# Patient Record
Sex: MALE | Race: WHITE | Employment: OTHER | URBAN - METROPOLITAN AREA
[De-identification: names, ages, dates, MRNs, and addresses within clinical notes are randomized per-mention and may not be internally consistent; named-entity substitution may affect disease eponyms.]

---

## 2017-01-05 ENCOUNTER — TELEPHONE (OUTPATIENT)
Dept: CARDIOLOGY CLINIC | Age: 62
End: 2017-01-05

## 2017-01-09 ENCOUNTER — PROCEDURE VISIT (OUTPATIENT)
Dept: CARDIOLOGY CLINIC | Age: 62
End: 2017-01-09

## 2017-01-09 ENCOUNTER — OFFICE VISIT (OUTPATIENT)
Dept: CARDIOLOGY CLINIC | Age: 62
End: 2017-01-09

## 2017-01-09 VITALS
OXYGEN SATURATION: 94 % | WEIGHT: 167 LBS | HEART RATE: 60 BPM | HEIGHT: 68 IN | SYSTOLIC BLOOD PRESSURE: 124 MMHG | BODY MASS INDEX: 25.31 KG/M2 | DIASTOLIC BLOOD PRESSURE: 62 MMHG

## 2017-01-09 DIAGNOSIS — I25.10 CORONARY ARTERY DISEASE INVOLVING NATIVE HEART WITHOUT ANGINA PECTORIS, UNSPECIFIED VESSEL OR LESION TYPE: Primary | ICD-10-CM

## 2017-01-09 DIAGNOSIS — I42.9 CARDIOMYOPATHY (HCC): ICD-10-CM

## 2017-01-09 DIAGNOSIS — I49.5 SINUS NODE DYSFUNCTION (HCC): ICD-10-CM

## 2017-01-09 DIAGNOSIS — Z95.0 CARDIAC PACEMAKER IN SITU: Primary | ICD-10-CM

## 2017-01-09 DIAGNOSIS — I50.22 SYSTOLIC CHF, CHRONIC (HCC): ICD-10-CM

## 2017-01-09 DIAGNOSIS — I10 ESSENTIAL HYPERTENSION: ICD-10-CM

## 2017-01-09 PROCEDURE — 93280 PM DEVICE PROGR EVAL DUAL: CPT | Performed by: INTERNAL MEDICINE

## 2017-01-09 PROCEDURE — 99214 OFFICE O/P EST MOD 30 MIN: CPT | Performed by: NURSE PRACTITIONER

## 2017-01-13 ENCOUNTER — TELEPHONE (OUTPATIENT)
Dept: CARDIOLOGY CLINIC | Age: 62
End: 2017-01-13

## 2017-02-09 ENCOUNTER — HOSPITAL ENCOUNTER (OUTPATIENT)
Dept: CARDIOLOGY | Facility: CLINIC | Age: 62
Discharge: HOME | End: 2017-02-09
Attending: NURSE PRACTITIONER | Admitting: NURSE PRACTITIONER

## 2017-02-09 ENCOUNTER — PROCEDURE VISIT (OUTPATIENT)
Dept: CARDIOLOGY CLINIC | Age: 62
End: 2017-02-09

## 2017-02-09 DIAGNOSIS — R07.9 CHEST PAIN, UNSPECIFIED TYPE: Primary | ICD-10-CM

## 2017-02-09 LAB
LV EF: 33 %
LV EF: 34 %
LVEF MODALITY: NORMAL
LVEF MODALITY: NORMAL

## 2017-02-13 ENCOUNTER — TELEPHONE (OUTPATIENT)
Dept: CARDIOLOGY CLINIC | Age: 62
End: 2017-02-13

## 2017-02-27 ENCOUNTER — OFFICE VISIT (OUTPATIENT)
Dept: CARDIOLOGY CLINIC | Age: 62
End: 2017-02-27

## 2017-02-27 VITALS
HEART RATE: 60 BPM | DIASTOLIC BLOOD PRESSURE: 68 MMHG | BODY MASS INDEX: 25.48 KG/M2 | OXYGEN SATURATION: 97 % | WEIGHT: 168.12 LBS | HEIGHT: 68 IN | SYSTOLIC BLOOD PRESSURE: 110 MMHG

## 2017-02-27 DIAGNOSIS — I10 ESSENTIAL HYPERTENSION: ICD-10-CM

## 2017-02-27 DIAGNOSIS — I25.110 CORONARY ARTERY DISEASE INVOLVING NATIVE CORONARY ARTERY OF NATIVE HEART WITH UNSTABLE ANGINA PECTORIS (HCC): ICD-10-CM

## 2017-02-27 DIAGNOSIS — Z95.5 PRESENCE OF STENT IN LAD CORONARY ARTERY: ICD-10-CM

## 2017-02-27 DIAGNOSIS — I42.9 CARDIOMYOPATHY (HCC): Primary | ICD-10-CM

## 2017-02-27 DIAGNOSIS — Z95.0 HX OF CARDIAC PACEMAKER: ICD-10-CM

## 2017-02-27 PROCEDURE — 99214 OFFICE O/P EST MOD 30 MIN: CPT | Performed by: INTERNAL MEDICINE

## 2017-02-27 RX ORDER — LISINOPRIL 10 MG/1
TABLET ORAL
Qty: 90 TABLET | Refills: 3 | Status: SHIPPED | OUTPATIENT
Start: 2017-02-27 | End: 2017-10-30 | Stop reason: SDUPTHER

## 2017-02-27 RX ORDER — ATORVASTATIN CALCIUM 80 MG/1
80 TABLET, FILM COATED ORAL NIGHTLY
Qty: 90 TABLET | Refills: 3 | Status: SHIPPED | OUTPATIENT
Start: 2017-02-27 | End: 2018-01-17 | Stop reason: SDUPTHER

## 2017-02-27 RX ORDER — CLOPIDOGREL BISULFATE 75 MG/1
75 TABLET ORAL DAILY
Qty: 90 TABLET | Refills: 3 | Status: ON HOLD | OUTPATIENT
Start: 2017-02-27 | End: 2017-07-13

## 2017-02-27 RX ORDER — CARVEDILOL 12.5 MG/1
12.5 TABLET ORAL 2 TIMES DAILY
Qty: 180 TABLET | Refills: 3 | Status: SHIPPED | OUTPATIENT
Start: 2017-02-27 | End: 2018-01-12 | Stop reason: SDUPTHER

## 2017-02-28 DIAGNOSIS — E78.2 MIXED HYPERLIPIDEMIA: Primary | ICD-10-CM

## 2017-04-26 ENCOUNTER — PROCEDURE VISIT (OUTPATIENT)
Dept: CARDIOLOGY CLINIC | Age: 62
End: 2017-04-26

## 2017-04-26 ENCOUNTER — OFFICE VISIT (OUTPATIENT)
Dept: CARDIOLOGY CLINIC | Age: 62
End: 2017-04-26

## 2017-04-26 VITALS
HEIGHT: 68 IN | BODY MASS INDEX: 25.61 KG/M2 | DIASTOLIC BLOOD PRESSURE: 64 MMHG | SYSTOLIC BLOOD PRESSURE: 122 MMHG | HEART RATE: 60 BPM | WEIGHT: 169 LBS

## 2017-04-26 DIAGNOSIS — I50.22 SYSTOLIC CHF, CHRONIC (HCC): ICD-10-CM

## 2017-04-26 DIAGNOSIS — Z95.0 HX OF CARDIAC PACEMAKER: Primary | ICD-10-CM

## 2017-04-26 DIAGNOSIS — I42.9 CARDIOMYOPATHY (HCC): ICD-10-CM

## 2017-04-26 DIAGNOSIS — I42.9 CARDIOMYOPATHY (HCC): Primary | ICD-10-CM

## 2017-04-26 DIAGNOSIS — I49.5 SINUS NODE DYSFUNCTION (HCC): ICD-10-CM

## 2017-04-26 DIAGNOSIS — Z95.0 CARDIAC PACEMAKER IN SITU: ICD-10-CM

## 2017-04-26 PROCEDURE — 99214 OFFICE O/P EST MOD 30 MIN: CPT | Performed by: INTERNAL MEDICINE

## 2017-04-26 PROCEDURE — 93280 PM DEVICE PROGR EVAL DUAL: CPT | Performed by: INTERNAL MEDICINE

## 2017-05-08 ENCOUNTER — TELEPHONE (OUTPATIENT)
Dept: CARDIOLOGY CLINIC | Age: 62
End: 2017-05-08

## 2017-05-25 ENCOUNTER — HOSPITAL ENCOUNTER (OUTPATIENT)
Dept: CARDIOLOGY | Facility: CLINIC | Age: 62
Discharge: HOME | End: 2017-05-25
Attending: INTERNAL MEDICINE | Admitting: INTERNAL MEDICINE

## 2017-06-02 ENCOUNTER — TELEPHONE (OUTPATIENT)
Dept: CARDIOLOGY CLINIC | Age: 62
End: 2017-06-02

## 2017-07-12 DIAGNOSIS — Z95.810 ICD (IMPLANTABLE CARDIOVERTER-DEFIBRILLATOR) IN PLACE: Primary | ICD-10-CM

## 2017-07-19 ENCOUNTER — PROCEDURE VISIT (OUTPATIENT)
Dept: CARDIOLOGY CLINIC | Age: 62
End: 2017-07-19

## 2017-07-19 DIAGNOSIS — I42.9 CARDIOMYOPATHY (HCC): ICD-10-CM

## 2017-07-19 DIAGNOSIS — I47.20 VENTRICULAR TACHYCARDIA: ICD-10-CM

## 2017-07-19 DIAGNOSIS — Z95.810 ICD (IMPLANTABLE CARDIOVERTER-DEFIBRILLATOR) IN PLACE: Primary | ICD-10-CM

## 2017-07-19 DIAGNOSIS — I49.5 SINUS NODE DYSFUNCTION (HCC): ICD-10-CM

## 2017-07-19 PROCEDURE — 93283 PRGRMG EVAL IMPLANTABLE DFB: CPT | Performed by: INTERNAL MEDICINE

## 2017-10-23 ENCOUNTER — OFFICE VISIT (OUTPATIENT)
Dept: CARDIOLOGY CLINIC | Age: 62
End: 2017-10-23

## 2017-10-23 ENCOUNTER — PROCEDURE VISIT (OUTPATIENT)
Dept: CARDIOLOGY CLINIC | Age: 62
End: 2017-10-23

## 2017-10-23 VITALS
BODY MASS INDEX: 26.52 KG/M2 | DIASTOLIC BLOOD PRESSURE: 62 MMHG | WEIGHT: 175 LBS | HEIGHT: 68 IN | SYSTOLIC BLOOD PRESSURE: 120 MMHG

## 2017-10-23 DIAGNOSIS — I50.22 SYSTOLIC CHF, CHRONIC (HCC): ICD-10-CM

## 2017-10-23 DIAGNOSIS — Z95.810 DUAL ICD (IMPLANTABLE CARDIOVERTER-DEFIBRILLATOR) IN PLACE: Primary | ICD-10-CM

## 2017-10-23 DIAGNOSIS — I25.110 CORONARY ARTERY DISEASE INVOLVING NATIVE CORONARY ARTERY OF NATIVE HEART WITH UNSTABLE ANGINA PECTORIS (HCC): ICD-10-CM

## 2017-10-23 DIAGNOSIS — Z95.810 ICD (IMPLANTABLE CARDIOVERTER-DEFIBRILLATOR) IN PLACE: ICD-10-CM

## 2017-10-23 DIAGNOSIS — Z95.810 DUAL ICD (IMPLANTABLE CARDIOVERTER-DEFIBRILLATOR) IN PLACE: ICD-10-CM

## 2017-10-23 DIAGNOSIS — I47.20 VENTRICULAR TACHYCARDIA: ICD-10-CM

## 2017-10-23 DIAGNOSIS — I48.0 PAF (PAROXYSMAL ATRIAL FIBRILLATION) (HCC): ICD-10-CM

## 2017-10-23 DIAGNOSIS — I25.5 ISCHEMIC CARDIOMYOPATHY: Primary | ICD-10-CM

## 2017-10-23 PROCEDURE — 99214 OFFICE O/P EST MOD 30 MIN: CPT | Performed by: INTERNAL MEDICINE

## 2017-10-23 PROCEDURE — 93283 PRGRMG EVAL IMPLANTABLE DFB: CPT | Performed by: INTERNAL MEDICINE

## 2017-10-23 PROCEDURE — 93290 INTERROG DEV EVAL ICPMS IP: CPT | Performed by: INTERNAL MEDICINE

## 2017-10-23 NOTE — PROGRESS NOTES
pack-year smoking history. He has never used smokeless tobacco. He reports that he drinks about 3.0 oz of alcohol per week . He reports that he does not use drugs. Family History: family history includes Diabetes in his mother; High Blood Pressure in his father, mother, and sister; Luane Todd / [place] in his mother. Home Medications:    Prior to Admission medications    Medication Sig Start Date End Date Taking? Authorizing Provider   clopidogrel (PLAVIX) 75 MG tablet Take 1 tablet by mouth daily 7/17/17  Yes Pradip Dickinson CNP   lisinopril (PRINIVIL;ZESTRIL) 10 MG tablet TAKE 1 TABLET BY MOUTH EVERY DAY 2/27/17  Yes Susan Arenas MD   atorvastatin (LIPITOR) 80 MG tablet Take 1 tablet by mouth nightly 2/27/17  Yes Susan Arenas MD   carvedilol (COREG) 12.5 MG tablet Take 1 tablet by mouth 2 times daily 2/27/17  Yes Susan Arenas MD   aspirin 81 MG chewable tablet Take 1 tablet by mouth daily. 5/25/13  Yes Angela Bonner CNP          REVIEW OF SYSTEMS:    · Constitutional: there has been no unanticipated weight loss. There's been no change in energy level, sleep pattern, or activity level. · Eyes: No visual changes or diplopia. No scleral icterus. · ENT: No Headaches, hearing loss or vertigo. No mouth sores or sore throat. · Cardiovascular: No for chest pain, No for dyspnea on exertion, No for palpitations or No for loss of consciousness. No cough, hemoptysis, No for pleuritic pain, or phlebitis. · Respiratory: No for cough or wheezing, no sputum production. No hematemesis. · Gastrointestinal: No abdominal pain, appetite loss, blood in stools. No change in bowel or bladder habits. · Genitourinary: No dysuria, trouble voiding, or hematuria. · Musculoskeletal:  No gait disturbance, No for weakness or joint complaints. · Integumentary: No rash or pruritis. · Neurological: No headache, diplopia, change in muscle strength, numbness or tingling.  No change in gait, balance, function. TAPSE is   measured at 13mm.         IMPRESSION:    1. Ischemic Cardiomyopathy ([address]      New Paroxysmal atrial fibrillation     RECOMMENDATIONS:  1. Follow up with jose Duque NP in 1 year  2. Keep appt with Dr. Fam Garcia as scheduled. Patient has new diagnosis of Paroxysmal atrial fibrillation lasting 9 min. I discussed [id number] score with him  If he has Paroxysmal atrial fibrillation reoccur ance, I will recommend starting anticoagulation  Continue ASA and Plavix    QUALITY MEASURES  1. Tobacco Cessation Counseling: Yes  2. Retake of BP if >140/90:   NA  3. Documentation to PCP/referring for new patient:  Sent to PCP at close of office visit  4. CAD patient on anti-platelet: Yes  5. CAD patient on STATIN therapy:  Yes  6. Patient with CHF and aFib on anticoagulation:  NA     ICD Registry:     1. Congestive heart failure class II  2. Ischemic vs non ischemic cardiomyopathy: Ischemic   3. How long did the patient have cardiomyopathy for: unsure   4. When was the last intervention: CABG 10/2015  5. Is patient on maximum tolerated medical therapy for more than 3 months: Yes   6. Does patient have atrial fibrillation: NA       All questions and concerns were addressed to the patient/family. Alternatives to my treatment were discussed. ANASTASIYA HeathC. Electrophysiology  [facility]. [address] [facility]. [address].   Lianna Barker12  Phone: [phone]  Fax: [phone]

## 2017-10-23 NOTE — PROGRESS NOTES
Device interrogation by company representative. See interrogation for further details. He had PAF and NSVT recorded. Thoracic impedance trend stable. Patient to see Dr. Patrick Ramos today. Follow up in 3 months via [employer].

## 2017-10-23 NOTE — LETTER
Past Medical History:   has a past medical history of CAD (coronary artery disease); COPD (chronic obstructive pulmonary disease) ([facility] [address] Hypertension; and Systolic CHF, chronic ([facility] [address] Past Surgical History:   has a past surgical history that includes other surgical history (1999); Coronary angioplasty with stent (05/21/2013); pacemaker placement; and Coronary artery bypass graft (10/14/2015). Allergies:  Review of patient's allergies indicates no known allergies. Social History:   reports that he has been smoking Cigarettes. He has a 20.00 pack-year smoking history. He has never used smokeless tobacco. He reports that he drinks about 3.0 oz of alcohol per week . He reports that he does not use drugs. Family History: family history includes Diabetes in his mother; High Blood Pressure in his father, mother, and sister; Vanice Sleeper / [place] in his mother. Home Medications:    Prior to Admission medications    Medication Sig Start Date End Date Taking? Authorizing Provider   clopidogrel (PLAVIX) 75 MG tablet Take 1 tablet by mouth daily 7/17/17  Yes Arnie Moreland CNP   lisinopril (PRINIVIL;ZESTRIL) 10 MG tablet TAKE 1 TABLET BY MOUTH EVERY DAY 2/27/17  Yes Malik Griffin MD   atorvastatin (LIPITOR) 80 MG tablet Take 1 tablet by mouth nightly 2/27/17  Yes Malik Griffin MD   carvedilol (COREG) 12.5 MG tablet Take 1 tablet by mouth 2 times daily 2/27/17  Yes Malik Griffin MD   aspirin 81 MG chewable tablet Take 1 tablet by mouth daily. 5/25/13  Yes Kathya Cr CNP          REVIEW OF SYSTEMS:    · Constitutional: there has been no unanticipated weight loss. There's been no change in energy level, sleep pattern, or activity level. · Eyes: No visual changes or diplopia. No scleral icterus. · ENT: No Headaches, hearing loss or vertigo. No mouth sores or sore throat.   · Cardiovascular: No for chest pain, No for dyspnea on exertion, No for palpitations or No for loss of consciousness. No cough, hemoptysis, No for pleuritic pain, or phlebitis. · Respiratory: No for cough or wheezing, no sputum production. No hematemesis. · Gastrointestinal: No abdominal pain, appetite loss, blood in stools. No change in bowel or bladder habits. · Genitourinary: No dysuria, trouble voiding, or hematuria. · Musculoskeletal:  No gait disturbance, No for weakness or joint complaints. · Integumentary: No rash or pruritis. · Neurological: No headache, diplopia, change in muscle strength, numbness or tingling. No change in gait, balance, coordination, mood, affect, memory, mentation, behavior. · Psychiatric: No anxiety, or depression. · Endocrine: No temperature intolerance. No excessive thirst, fluid intake, or urination. No tremor. · Hematologic/Lymphatic: No abnormal bruising or bleeding, blood clots or swollen lymph nodes. · Allergic/Immunologic: No nasal congestion or hives. Physical Examination:    /62   Ht 5' 8\" (1.727 m)   Wt 175 lb (79.4 kg)   BMI 26.61 kg/m²       Constitutional and General Appearance:    alert, cooperative, no distress and appears stated age  [de-identified]:    PERRLA, no cervical lymphadenopathy. No masses palpable. Normal oral mucosa  Respiratory:  · Normal excursion and expansion without use of accessory muscles  · Resp Auscultation: Normal breath sounds without dullness or wheezing  Cardiovascular:  · The apical impulse is not displaced  · II/VI LAST  · Jugular venous pulsation Normal  · The carotid upstroke is normal in amplitude and contour without delay or bruit  · Peripheral pulses are symmetrical and full  Abdomen:  · No masses or tenderness  · Bowel sounds present  Extremities:  ·  No Cyanosis or Clubbing  ·  Lower extremity edema: No  · Skin: Warm and dry  Neurological:  · Alert and oriented.   · Moves all extremities well  · No abnormalities of mood, affect, memory, mentation, or behavior are noted      DATA: ECG:  normal EKG, normal sinus rhythm, unchanged from previous tracings. ECHO: 2/2017  Summary   -- Normal left ventricle size. Mild concentric left ventricular hypertrophy.   The left ventricular systolic function is moderately reduced with an   ejection fraction of 30-35 %. The apex and apical segments are akinetic.   Akinesis of the anteroseptal wall. No obvious LV thrombus seen. Grade II   diastolic dysfunction with elevated filing pressure.   -- Mild mitral regurgitation.   -- Mild aortic stenosis. Moderate aortic regurgitation.   -- The right ventricle is normal in size with decreased function. TAPSE is   measured at 13mm.         IMPRESSION:    1. Ischemic Cardiomyopathy ([address]      New Paroxysmal atrial fibrillation     RECOMMENDATIONS:  1. Follow up with jose Duque NP in 1 year  2. Keep appt with Dr. Kayla Riojas as scheduled. Patient has new diagnosis of Paroxysmal atrial fibrillation lasting 9 min. I discussed [id number] score with him  If he has Paroxysmal atrial fibrillation reoccur ance, I will recommend starting anticoagulation  Continue ASA and Plavix    QUALITY MEASURES  1. Tobacco Cessation Counseling: Yes  2. Retake of BP if >140/90:   NA  3. Documentation to PCP/referring for new patient:  Sent to PCP at close of office visit  4. CAD patient on anti-platelet: Yes  5. CAD patient on STATIN therapy:  Yes  6. Patient with CHF and aFib on anticoagulation:  NA     ICD Registry:     1. Congestive heart failure class II  2. Ischemic vs non ischemic cardiomyopathy: Ischemic   3. How long did the patient have cardiomyopathy for: unsure   4. When was the last intervention: CABG 10/2015  5. Is patient on maximum tolerated medical therapy for more than 3 months: Yes   6. Does patient have atrial fibrillation: NA       All questions and concerns were addressed to the patient/family. Alternatives to my treatment were discussed. PK Dior F.A.C.C. Electrophysiology  [facility].

## 2017-10-29 NOTE — COMMUNICATION BODY
[facility]   Electrophysiology Note              Date:  October 23, 2017  Patient name: Lam Moreno  YOB: 1955    Reason for follow up: ischemic cardiomyopathy and Paroxysmal atrial fibrillation   Requesting Physician:Dr. Amairani Hart     HISTORY OF PRESENT ILLNESS: Lam Moreno is a 57 y.o. male who presents for evaluation for a possible device upgrade. He had a dual chamber pacemaker placed on 5/24/13 for bradycardia. He has a history of CAD. He underwent CABG x2 10/2015 in the setting of an MI. His stress test from 2/9/17 showed an EF of 34%. His echo from 2/2017 confirmed an EF of 30-35%. His device check today shows normal device function; AP-94% -1.3%. He reports he has been feeling well. He has been taking his medications as prescribed. Patient currently denies any weight gain, edema, palpitations, chest pain, shortness of breath, dizziness, and syncope. Today he presents for follow of ischemic cardiomyopathy and Paroxysmal atrial fibrillation. He underwent upgrade to dual chamber ICD on 7/12/17. Device check today showed PAF for 9 minutes and NSVT. This was an isolated incident of PAF. Chads score 3. If PAFcontinues we will consider anticoagulation. He continues to smoke 5 cigarettes per day. Patient currently denies any weight gain, edema, palpitations, chest pain, shortness of breath, dizziness, and syncope. Past Medical History:   has a past medical history of CAD (coronary artery disease); COPD (chronic obstructive pulmonary disease) ([facility] [address] Hypertension; and Systolic CHF, chronic ([facility] [address] Past Surgical History:   has a past surgical history that includes other surgical history (1999); Coronary angioplasty with stent (05/21/2013); pacemaker placement; and Coronary artery bypass graft (10/14/2015). Allergies:  Review of patient's allergies indicates no known allergies. Social History:   reports that he has been smoking Cigarettes.   He has a 20.00 function. TAPSE is   measured at 13mm.         IMPRESSION:    1. Ischemic Cardiomyopathy ([address]      New Paroxysmal atrial fibrillation     RECOMMENDATIONS:  1. Follow up with jose Duque NP in 1 year  2. Keep appt with Dr. Fam Garcia as scheduled. Patient has new diagnosis of Paroxysmal atrial fibrillation lasting 9 min. I discussed [id number] score with him  If he has Paroxysmal atrial fibrillation reoccur ance, I will recommend starting anticoagulation  Continue ASA and Plavix    QUALITY MEASURES  1. Tobacco Cessation Counseling: Yes  2. Retake of BP if >140/90:   NA  3. Documentation to PCP/referring for new patient:  Sent to PCP at close of office visit  4. CAD patient on anti-platelet: Yes  5. CAD patient on STATIN therapy:  Yes  6. Patient with CHF and aFib on anticoagulation:  NA     ICD Registry:     1. Congestive heart failure class II  2. Ischemic vs non ischemic cardiomyopathy: Ischemic   3. How long did the patient have cardiomyopathy for: unsure   4. When was the last intervention: CABG 10/2015  5. Is patient on maximum tolerated medical therapy for more than 3 months: Yes   6. Does patient have atrial fibrillation: NA       All questions and concerns were addressed to the patient/family. Alternatives to my treatment were discussed. ANASTASIYA HeathC. Electrophysiology  [facility]. [address]. [address].   Jose Juan Barker  Phone: [phone]  Fax: [phone]

## 2017-10-30 ENCOUNTER — OFFICE VISIT (OUTPATIENT)
Dept: CARDIOLOGY CLINIC | Age: 62
End: 2017-10-30

## 2017-10-30 VITALS
HEART RATE: 64 BPM | DIASTOLIC BLOOD PRESSURE: 72 MMHG | WEIGHT: 176 LBS | HEIGHT: 68 IN | OXYGEN SATURATION: 97 % | SYSTOLIC BLOOD PRESSURE: 122 MMHG | BODY MASS INDEX: 26.67 KG/M2

## 2017-10-30 DIAGNOSIS — Z95.810 DUAL ICD (IMPLANTABLE CARDIOVERTER-DEFIBRILLATOR) IN PLACE: ICD-10-CM

## 2017-10-30 DIAGNOSIS — I47.20 VENTRICULAR TACHYCARDIA: Primary | ICD-10-CM

## 2017-10-30 DIAGNOSIS — I25.110 CORONARY ARTERY DISEASE INVOLVING NATIVE CORONARY ARTERY OF NATIVE HEART WITH UNSTABLE ANGINA PECTORIS (HCC): ICD-10-CM

## 2017-10-30 DIAGNOSIS — I50.22 SYSTOLIC CHF, CHRONIC (HCC): ICD-10-CM

## 2017-10-30 DIAGNOSIS — I48.0 PAF (PAROXYSMAL ATRIAL FIBRILLATION) (HCC): ICD-10-CM

## 2017-10-30 PROCEDURE — 99215 OFFICE O/P EST HI 40 MIN: CPT | Performed by: INTERNAL MEDICINE

## 2017-10-30 RX ORDER — LISINOPRIL 10 MG/1
10 TABLET ORAL 2 TIMES DAILY
Qty: 180 TABLET | Refills: 3 | Status: SHIPPED | OUTPATIENT
Start: 2017-10-30 | End: 2018-01-12 | Stop reason: SDUPTHER

## 2017-10-30 NOTE — PROGRESS NOTES
[address]   Cardiac Followup    Referring Provider:  Bette Ascencio MD     Chief Complaint   Patient presents with    6 Month Follow-Up     ICD checked 10/23/2017, last seen Dr. Lisbet Alfaro 10/23/2017    Cardiomyopathy    Discuss Labs     cmp 07/13/2017 & lip 08/2016      Subjective: Patient being seen today for cardiology follow up of CAD, CMP, HTN, smoker, ICD    History of Present Illness:   Mr. Vahid Duffy is a 54yo male who has hx CAD s/p NSTEMI on 03/12/2015, ischemic cardiomyopathy, and s/p . In May 2013 he had anterior STEMI with subtotal occlusion with thrombus in LAD at 1st septal  branch and mild diffuse disease (30% RCA) elsewhere. Successful OANH placed to LAD and LV fxn preserved EF=50%. Post-procedure he developed symptomatic junctional rhythm and sinus bradycardia requiring dual chamber pacemaker insertion on 5/24/13. He presented to the ER on 03/12/2015 with CP and diagnosed with NSTEMI with peak TnI=67. He underwent cardiac that showed LM 30%  LAD 50% prox, 100% mid in stent; Cx 40% RCA 60-70% prox RPL 99% mid with collateral LAD to LAD and RCA to LAD Collaterals LAD to RPL LV: apical akinesis, anterior hypokinesis. EF 25-30%. He had PCI S/P cutting balloon PTCA and BMS placement to LAD for instent restenosis. On follow up 6/2/15 he reported he had brief, rare episodes CP in which nitro helped resolve. He denied palpitations, dizziness, SOB, or syncope. Note he underwent PM device check in July and October 2015 that showed NSVT. The NSVT worsened in October and he had symptoms of chest pain . Subsequently, he underwent most recent cardiac cath 10/9/15 that showed recurrent instent restenosis of LAD and CABG deemed necessary. He is s/p 2V CABG with LIMA-LAD and SVG-distal RCA on 10/14/15. Carotid doppler 10/9/15 less than 50% bilaterally. Note ECHO 10/10/15 showed EF=40%, mild MR/AS; moderate AI with eccentric jet is present.    He c/o atypical CP and SOB at NP chamber ICD. Most recent device check 10/23/17 showed PAF for 9 minutes and NSVT. This was an isolated incident of PAF  Thoracic impedance trend stable. . May need to use coumadin or anticoagulations if any further evidence of PAF      3. CAD (coronary artery disease) Stable and will continue present medical regimen. ECHO 10/10/15 showed EF=40%, mild MR/AS and moderate AI with eccentric jet is present. He underwent most recent cardiac cath 10/9/15 due to chest pain and worsened NSVT on PM interrogation. Cath showed recurrent instent restenosis of LAD and CABG deemed necessary. Carotid doppler 10/9/15 less than 50% bilaterally. He is s/p 2V CABG with LIMA-LAD and SVG-distal RCA on 10/14/15. Recent CP and SOB at NP visit prompted testing. Most recent stress test 2/9/17 LVEF of 34%. Severe global wall motion with  akinesis of the apical sepal wall.  large sized severe defect in  the mid/apical anterior, septal, and entire inferior wall at stress and rest  consistent with scar. No significant ischemia was seen. Most recent ECHO 2/9/17 showed mild concentric LVH, EF of 30-35%; apex and apical segments are akinetic. Akinesis of the anteroseptal wall. No obvious LV thrombus seen. Grade II diastolic dysfunction with elevated filing pressure; mild MR/AS; Moderate aortic regurgitation     4. Cardiac pacemaker in situ:  Stable. Junctional rhythm and sinus bradycardia requiring dual chamber pacemaker insertion on 5/24/13. See # 2 above. Device check 1/9/17 [employer]. All sensing and pacing parameters are within normal range. No changes need to be made at this time. I am concerned with large area of scar on stress testing, ischemic cardiomyopathy, and EF=30-35%. Therefore, he was upgraded him 7/12/17 to dual chamber ICD. Most recent device check 10/23/17 showed PAF for 9 minutes and NSVT (note 4/15 PAF noted but not further problems until recently); Thoracic impedance trend stable. May need to use coumadin or anticoagulations if any further evidence of PAF. He does not want to take stronger blood thinner unless absolutely necessary. 5.  Last lipids 8/8/16:     HDL32   LDL  66. Need to recheck now but well controlled and will continue current medical regimen until then. Plan:  1. Will recheck lipids, bmp in 1 week  2. Will increase Lisinopril to 10 mg twice a day for LV systolic dysfunction history. 3. The patient is asked to make an attempt to improve diet and exercise patterns to aid in medical management of this problem. 4. Counseled on smoking cessation but I doubt he will quit. 5.  Follow up with me in 6 months. Cost of prescription medications and patient compliance have been reviewed with patient. All questions answered. Zaheer Tuttle.  Kailash Hanley M.D., [facility]

## 2017-10-30 NOTE — COMMUNICATION BODY
tremor. · Hematologic/Lymphatic: No abnormal bruising or bleeding, blood clots or swollen lymph nodes. · Allergic/Immunologic: No nasal congestion or hives. Physical Examination:    Vitals:    10/30/17 1428   BP: 122/72   Pulse: 64   SpO2: 97%        Constitutional and General Appearance: NAD   Respiratory:  · Normal excursion and expansion without use of accessory muscles  · Resp Auscultation: Soft Normal breath sounds without dullness  Cardiovascular:  · The apical impulses not displaced  · Heart tones are crisp and normal  · Cervical veins are not engorged  · The carotid upstroke is normal in amplitude and contour without delay or bruit  · Normal S1S2, No S3, Soft 2/6  systolic ejection murmur  · Peripheral pulses are symmetrical and full  · There is no clubbing, cyanosis of the extremities.   · No edema  · Femoral Arteries: 2+ and equal  · Pedal Pulses: 2+ and equal   Abdomen:  · No masses or tenderness  · Liver/Spleen: No Abnormalities Noted  Neurological/Psychiatric:  · Alert and oriented in all spheres  · Moves all extremities well  · Exhibits normal gait balance and coordination  · No abnormalities of mood, affect, memory, mentation, or behavior are noted  ·   Lab Results   Component Value Date    CHOL 125 08/08/2016    CHOL 124 10/13/2015    CHOL 141 10/09/2015     Lab Results   Component Value Date    TRIG 133 08/08/2016    TRIG 183 (H) 10/13/2015    TRIG 101 10/09/2015     Lab Results   Component Value Date    HDL 32 (L) 08/08/2016    HDL 32 (L) 10/13/2015    HDL 44 10/09/2015     Lab Results   Component Value Date    LDLCALC 66 08/08/2016    LDLCALC 55 10/13/2015    LDLCALC 77 10/09/2015     Lab Results   Component Value Date    LABVLDL 27 08/08/2016    LABVLDL 37 10/13/2015    LABVLDL 20 10/09/2015     No results found for: CHOLHDLRATIO  Lab Results   Component Value Date    CREATININE 0.9 07/12/2017    BUN 15 07/12/2017     07/12/2017    K 4.4 07/12/2017     07/12/2017    CO2 26 07/12/2017     Lab Results   Component Value Date    ALT 15 07/12/2017    AST 19 07/12/2017    ALKPHOS 61 07/12/2017    BILITOT 0.3 07/12/2017     Cath: 10/9/15  LM 30%  LAD 50% prox, 95% mid in stent  Cx 40%  OM1 20-30%  RI 20-30%  RCA 50%, 70% mid. FFR 0.73  RPL 95% distal  LV: apical dyskinesis. EF 35%    Recurrent LAD restenosis and significant RCA stenosis. CABG best option. Needs plavix hold  Last echo 5/2015 w/ mild AS and moderate AI. Will repeat echo. May need MELLISA prior to CABG for ? AVR. Admit due to [place] and V-tach      CABG x 2 10/14/15  LIMA to LAD and distal to RCA      Cardiac Cath: 03/12/2015  LM 30%  LAD 50% prox, 100% mid in stent  Cx 40%  RCA 60-70% prox  RPL 99% mid  Collateral LAD to LAD and RCA to LAD  Collaterals LAD to RPL  LV: apical akinesis, anterior hypokinesis. EF 25-30%    PTCA LAD  2.25 x 26 BMS  3.0 x 10 cutting ballon    Lab Results   Component Value Date    CHOL 125 08/08/2016    CHOL 124 10/13/2015    CHOL 141 10/09/2015     Lab Results   Component Value Date    TRIG 133 08/08/2016    TRIG 183 (H) 10/13/2015    TRIG 101 10/09/2015     Lab Results   Component Value Date    HDL 32 (L) 08/08/2016    HDL 32 (L) 10/13/2015    HDL 44 10/09/2015     Lab Results   Component Value Date    LDLCALC 66 08/08/2016    LDLCALC 55 10/13/2015    LDLCALC 77 10/09/2015     Lab Results   Component Value Date    LABVLDL 27 08/08/2016    LABVLDL 37 10/13/2015    LABVLDL 20 10/09/2015     No results found for: CHOLHDLRATIO      Assessment:     1. NSTEMI:  Resolved. Stable and will continue present medical regimen. There are no concerning symptoms for angina currently. I strongly encouraged him to quit smoking entirely and take medications as prescribed. 2. Bradycardia :resolved: Dual-chamber pacemaker placed 5/24/13. Device check in April 2015 showed episodes of paroxysmal afib and July/October 2015 showed NSVT worsening.   He is being followed by Dr Nola Johnston and he upgraded him 7/12/17 to dual

## 2017-11-07 ENCOUNTER — HOSPITAL ENCOUNTER (OUTPATIENT)
Dept: GENERAL RADIOLOGY | Age: 62
Discharge: HOME | End: 2017-11-07
Attending: INTERNAL MEDICINE | Admitting: INTERNAL MEDICINE

## 2017-11-07 DIAGNOSIS — I25.110 CORONARY ARTERY DISEASE INVOLVING NATIVE CORONARY ARTERY OF NATIVE HEART WITH UNSTABLE ANGINA PECTORIS (HCC): ICD-10-CM

## 2017-11-07 LAB
ANION GAP SERPL CALCULATED.3IONS-SCNC: 9 MMOL/L (ref 3–16)
BUN BLDV-MCNC: 17 MG/DL (ref 7–20)
CALCIUM SERPL-MCNC: 9.6 MG/DL (ref 8.3–10.6)
CHLORIDE BLD-SCNC: 104 MMOL/L (ref 99–110)
CHOLESTEROL, TOTAL: 122 MG/DL (ref 0–199)
CO2: 28 MMOL/L (ref 21–32)
CREAT SERPL-MCNC: 0.8 MG/DL (ref 0.8–1.3)
GFR AFRICAN AMERICAN: >60
GFR NON-AFRICAN AMERICAN: >60
GLUCOSE BLD-MCNC: 110 MG/DL (ref 70–99)
HDLC SERPL-MCNC: 27 MG/DL (ref 40–60)
LDL CHOLESTEROL CALCULATED: 68 MG/DL
POTASSIUM SERPL-SCNC: 4.3 MMOL/L (ref 3.5–5.1)
SODIUM BLD-SCNC: 141 MMOL/L (ref 136–145)
TRIGL SERPL-MCNC: 134 MG/DL (ref 0–150)
VLDLC SERPL CALC-MCNC: 27 MG/DL

## 2017-11-10 ENCOUNTER — TELEPHONE (OUTPATIENT)
Dept: CARDIOLOGY CLINIC | Age: 62
End: 2017-11-10

## 2017-11-10 NOTE — TELEPHONE ENCOUNTER
----- Message from Danielle Perez MD sent at 11/10/2017  9:26 AM EST -----  Excellent labs except for chronically low HDL.  CPM

## 2018-01-12 RX ORDER — CLOPIDOGREL BISULFATE 75 MG/1
75 TABLET ORAL DAILY
Qty: 90 TABLET | Refills: 5 | Status: SHIPPED | OUTPATIENT
Start: 2018-01-12 | End: 2018-03-26 | Stop reason: SDUPTHER

## 2018-01-12 RX ORDER — LISINOPRIL 10 MG/1
10 TABLET ORAL 2 TIMES DAILY
Qty: 180 TABLET | Refills: 5 | Status: SHIPPED | OUTPATIENT
Start: 2018-01-12 | End: 2019-01-23 | Stop reason: SDUPTHER

## 2018-01-12 RX ORDER — CARVEDILOL 12.5 MG/1
12.5 TABLET ORAL 2 TIMES DAILY
Qty: 180 TABLET | Refills: 5 | Status: SHIPPED | OUTPATIENT
Start: 2018-01-12 | End: 2019-01-23 | Stop reason: SDUPTHER

## 2018-01-12 NOTE — TELEPHONE ENCOUNTER
Last OV 10.30.17      Assessment:      1. NSTEMI:  Resolved. Stable and will continue present medical regimen. There are no concerning symptoms for angina currently. I strongly encouraged him to quit smoking entirely and take medications as prescribed. 2. Bradycardia :resolved: Dual-chamber pacemaker placed 5/24/13. Device check in April 2015 showed episodes of paroxysmal afib and July/October 2015 showed NSVT worsening. He is being followed by Dr Jimi Mai and he upgraded him 7/12/17 to dual chamber ICD. Most recent device check 10/23/17 showed PAF for 9 minutes and NSVT. This was an isolated incident of PAF  Thoracic impedance trend stable. . May need to use coumadin or anticoagulations if any further evidence of PAF    3. CAD (coronary artery disease) Stable and will continue present medical regimen. ECHO 10/10/15 showed EF=40%, mild MR/AS and moderate AI with eccentric jet is present. He underwent most recent cardiac cath 10/9/15 due to chest pain and worsened NSVT on PM interrogation. Cath showed recurrent instent restenosis of LAD and CABG deemed necessary. Carotid doppler 10/9/15 less than 50% bilaterally. He is s/p 2V CABG with LIMA-LAD and SVG-distal RCA on 10/14/15. Recent CP and SOB at NP visit prompted testing. Most recent stress test 2/9/17 LVEF of 34%. Severe global wall motion with  akinesis of the apical sepal wall.  large sized severe defect in  the mid/apical anterior, septal, and entire inferior wall at stress and rest  consistent with scar. No significant ischemia was seen. Most recent ECHO 2/9/17 showed mild concentric LVH, EF of 30-35%; apex and apical segments are akinetic. Akinesis of the anteroseptal wall. No obvious LV thrombus seen. Grade II diastolic dysfunction with elevated filing pressure; mild MR/AS; Moderate aortic regurgitation   4. Cardiac pacemaker in situ:  Stable.  Junctional rhythm and sinus bradycardia requiring dual chamber pacemaker insertion on

## 2018-01-17 RX ORDER — ATORVASTATIN CALCIUM 80 MG/1
80 TABLET, FILM COATED ORAL NIGHTLY
Qty: 90 TABLET | Refills: 5 | Status: SHIPPED | OUTPATIENT
Start: 2018-01-17 | End: 2018-03-26 | Stop reason: SDUPTHER

## 2018-01-23 ENCOUNTER — NURSE ONLY (OUTPATIENT)
Dept: CARDIOLOGY CLINIC | Age: 63
End: 2018-01-23

## 2018-01-23 DIAGNOSIS — Z95.810 ICD (IMPLANTABLE CARDIOVERTER-DEFIBRILLATOR) IN PLACE: Primary | ICD-10-CM

## 2018-01-23 DIAGNOSIS — I25.5 ISCHEMIC CARDIOMYOPATHY: ICD-10-CM

## 2018-01-23 PROCEDURE — 93295 DEV INTERROG REMOTE 1/2/MLT: CPT | Performed by: INTERNAL MEDICINE

## 2018-01-23 PROCEDURE — 93296 REM INTERROG EVL PM/IDS: CPT | Performed by: INTERNAL MEDICINE

## 2018-03-26 RX ORDER — CLOPIDOGREL BISULFATE 75 MG/1
75 TABLET ORAL DAILY
Qty: 90 TABLET | Refills: 5 | Status: SHIPPED | OUTPATIENT
Start: 2018-03-26 | End: 2019-01-23 | Stop reason: SDUPTHER

## 2018-03-26 RX ORDER — ATORVASTATIN CALCIUM 80 MG/1
80 TABLET, FILM COATED ORAL NIGHTLY
Qty: 90 TABLET | Refills: 5 | Status: SHIPPED | OUTPATIENT
Start: 2018-03-26 | End: 2019-01-23 | Stop reason: SDUPTHER

## 2018-04-24 ENCOUNTER — NURSE ONLY (OUTPATIENT)
Dept: CARDIOLOGY CLINIC | Age: 63
End: 2018-04-24

## 2018-04-24 DIAGNOSIS — I25.5 ISCHEMIC CARDIOMYOPATHY: ICD-10-CM

## 2018-04-24 DIAGNOSIS — I50.22 SYSTOLIC CHF, CHRONIC (HCC): ICD-10-CM

## 2018-04-24 DIAGNOSIS — Z95.810 ICD (IMPLANTABLE CARDIOVERTER-DEFIBRILLATOR) IN PLACE: Primary | ICD-10-CM

## 2018-04-24 DIAGNOSIS — I47.20 VENTRICULAR TACHYCARDIA: ICD-10-CM

## 2018-04-24 PROCEDURE — 93296 REM INTERROG EVL PM/IDS: CPT | Performed by: INTERNAL MEDICINE

## 2018-04-24 PROCEDURE — 93297 REM INTERROG DEV EVAL ICPMS: CPT | Performed by: INTERNAL MEDICINE

## 2018-04-24 PROCEDURE — 93295 DEV INTERROG REMOTE 1/2/MLT: CPT | Performed by: INTERNAL MEDICINE

## 2018-04-26 ENCOUNTER — OFFICE VISIT (OUTPATIENT)
Dept: CARDIOLOGY CLINIC | Age: 63
End: 2018-04-26

## 2018-04-26 VITALS
BODY MASS INDEX: 27.74 KG/M2 | SYSTOLIC BLOOD PRESSURE: 134 MMHG | HEIGHT: 68 IN | DIASTOLIC BLOOD PRESSURE: 80 MMHG | WEIGHT: 183 LBS | OXYGEN SATURATION: 98 % | HEART RATE: 80 BPM

## 2018-04-26 DIAGNOSIS — I25.110 CORONARY ARTERY DISEASE INVOLVING NATIVE CORONARY ARTERY OF NATIVE HEART WITH UNSTABLE ANGINA PECTORIS (HCC): ICD-10-CM

## 2018-04-26 DIAGNOSIS — I48.0 PAF (PAROXYSMAL ATRIAL FIBRILLATION) (HCC): ICD-10-CM

## 2018-04-26 DIAGNOSIS — I10 ESSENTIAL HYPERTENSION: ICD-10-CM

## 2018-04-26 DIAGNOSIS — I25.5 ISCHEMIC CARDIOMYOPATHY: ICD-10-CM

## 2018-04-26 DIAGNOSIS — Z95.0 HX OF CARDIAC PACEMAKER: ICD-10-CM

## 2018-04-26 DIAGNOSIS — I50.22 SYSTOLIC CHF, CHRONIC (HCC): Primary | ICD-10-CM

## 2018-04-26 PROCEDURE — 3017F COLORECTAL CA SCREEN DOC REV: CPT | Performed by: INTERNAL MEDICINE

## 2018-04-26 PROCEDURE — 4004F PT TOBACCO SCREEN RCVD TLK: CPT | Performed by: INTERNAL MEDICINE

## 2018-04-26 PROCEDURE — G8598 ASA/ANTIPLAT THER USED: HCPCS | Performed by: INTERNAL MEDICINE

## 2018-04-26 PROCEDURE — 1123F ACP DISCUSS/DSCN MKR DOCD: CPT | Performed by: INTERNAL MEDICINE

## 2018-04-26 PROCEDURE — G8427 DOCREV CUR MEDS BY ELIG CLIN: HCPCS | Performed by: INTERNAL MEDICINE

## 2018-04-26 PROCEDURE — 99214 OFFICE O/P EST MOD 30 MIN: CPT | Performed by: INTERNAL MEDICINE

## 2018-04-26 PROCEDURE — G8417 CALC BMI ABV UP PARAM F/U: HCPCS | Performed by: INTERNAL MEDICINE

## 2018-04-26 PROCEDURE — 4040F PNEUMOC VAC/ADMIN/RCVD: CPT | Performed by: INTERNAL MEDICINE

## 2018-07-24 ENCOUNTER — NURSE ONLY (OUTPATIENT)
Dept: CARDIOLOGY CLINIC | Age: 63
End: 2018-07-24

## 2018-07-24 DIAGNOSIS — Z95.810 ICD (IMPLANTABLE CARDIOVERTER-DEFIBRILLATOR) IN PLACE: Primary | ICD-10-CM

## 2018-07-24 DIAGNOSIS — I50.22 SYSTOLIC CHF, CHRONIC (HCC): ICD-10-CM

## 2018-07-24 DIAGNOSIS — I25.5 ISCHEMIC CARDIOMYOPATHY: ICD-10-CM

## 2018-07-24 PROCEDURE — 93295 DEV INTERROG REMOTE 1/2/MLT: CPT | Performed by: INTERNAL MEDICINE

## 2018-07-24 PROCEDURE — 93297 REM INTERROG DEV EVAL ICPMS: CPT | Performed by: INTERNAL MEDICINE

## 2018-07-24 PROCEDURE — 93296 REM INTERROG EVL PM/IDS: CPT | Performed by: INTERNAL MEDICINE

## 2018-10-29 ENCOUNTER — OFFICE VISIT (OUTPATIENT)
Dept: CARDIOLOGY CLINIC | Age: 63
End: 2018-10-29
Payer: MEDICARE

## 2018-10-29 ENCOUNTER — PROCEDURE VISIT (OUTPATIENT)
Dept: CARDIOLOGY CLINIC | Age: 63
End: 2018-10-29
Payer: MEDICARE

## 2018-10-29 VITALS
WEIGHT: 175 LBS | HEART RATE: 75 BPM | OXYGEN SATURATION: 98 % | DIASTOLIC BLOOD PRESSURE: 84 MMHG | SYSTOLIC BLOOD PRESSURE: 128 MMHG | BODY MASS INDEX: 26.52 KG/M2 | HEIGHT: 68 IN

## 2018-10-29 DIAGNOSIS — I25.5 ISCHEMIC CARDIOMYOPATHY: Primary | ICD-10-CM

## 2018-10-29 DIAGNOSIS — Z95.810 ICD (IMPLANTABLE CARDIOVERTER-DEFIBRILLATOR) IN PLACE: Primary | ICD-10-CM

## 2018-10-29 DIAGNOSIS — I50.22 SYSTOLIC CHF, CHRONIC (HCC): ICD-10-CM

## 2018-10-29 DIAGNOSIS — I25.5 ISCHEMIC CARDIOMYOPATHY: ICD-10-CM

## 2018-10-29 DIAGNOSIS — I48.0 PAF (PAROXYSMAL ATRIAL FIBRILLATION) (HCC): ICD-10-CM

## 2018-10-29 PROCEDURE — G8598 ASA/ANTIPLAT THER USED: HCPCS | Performed by: INTERNAL MEDICINE

## 2018-10-29 PROCEDURE — 4004F PT TOBACCO SCREEN RCVD TLK: CPT | Performed by: INTERNAL MEDICINE

## 2018-10-29 PROCEDURE — G8417 CALC BMI ABV UP PARAM F/U: HCPCS | Performed by: INTERNAL MEDICINE

## 2018-10-29 PROCEDURE — 99214 OFFICE O/P EST MOD 30 MIN: CPT | Performed by: INTERNAL MEDICINE

## 2018-10-29 PROCEDURE — 93290 INTERROG DEV EVAL ICPMS IP: CPT | Performed by: INTERNAL MEDICINE

## 2018-10-29 PROCEDURE — 1101F PT FALLS ASSESS-DOCD LE1/YR: CPT | Performed by: INTERNAL MEDICINE

## 2018-10-29 PROCEDURE — 4040F PNEUMOC VAC/ADMIN/RCVD: CPT | Performed by: INTERNAL MEDICINE

## 2018-10-29 PROCEDURE — 1123F ACP DISCUSS/DSCN MKR DOCD: CPT | Performed by: INTERNAL MEDICINE

## 2018-10-29 PROCEDURE — 3017F COLORECTAL CA SCREEN DOC REV: CPT | Performed by: INTERNAL MEDICINE

## 2018-10-29 PROCEDURE — G8427 DOCREV CUR MEDS BY ELIG CLIN: HCPCS | Performed by: INTERNAL MEDICINE

## 2018-10-29 PROCEDURE — G8484 FLU IMMUNIZE NO ADMIN: HCPCS | Performed by: INTERNAL MEDICINE

## 2018-10-29 PROCEDURE — 93283 PRGRMG EVAL IMPLANTABLE DFB: CPT | Performed by: INTERNAL MEDICINE

## 2018-10-29 NOTE — PROGRESS NOTES
oriented. · Moves all extremities well  · No abnormalities of mood, affect, memory, mentation, or behavior are noted      DATA:    ECG:  normal EKG, normal sinus rhythm, unchanged from previous tracings. ECHO: 2/2017  Summary   -- Normal left ventricle size. Mild concentric left ventricular hypertrophy.   The left ventricular systolic function is moderately reduced with an   ejection fraction of 30-35 %. The apex and apical segments are akinetic.   Akinesis of the anteroseptal wall. No obvious LV thrombus seen. Grade II   diastolic dysfunction with elevated filing pressure.   -- Mild mitral regurgitation.   -- Mild aortic stenosis. Moderate aortic regurgitation.   -- The right ventricle is normal in size with decreased function. TAPSE is   measured at 13mm.         IMPRESSION:    1. Ischemic Cardiomyopathy ([address]    2. Paroxysmal atrial fibrillation: No further episode since 10/17. Patient not taking AC    RECOMMENDATIONS:  1. Continue current medications as prescribed. 2. Importance of smoking cessation discussed. 3. Follow up with Dr. Clayton Andrade as planned and me as needed. QUALITY MEASURES  1. Tobacco Cessation Counseling: Yes  2. Retake of BP if >140/90:   NA  3. Documentation to PCP/referring for new patient:  Sent to PCP at close of office visit  4. CAD patient on anti-platelet: Yes  5. CAD patient on STATIN therapy:  Yes  6. Patient Paroxysmal atrial fibrillation on anticoagulation:  No     ICD Registry:     1. Congestive heart failure class II  2. Ischemic vs non ischemic cardiomyopathy: Ischemic   3. How long did the patient have cardiomyopathy for: unsure   4. When was the last intervention: CABG 10/2015  5. Is patient on maximum tolerated medical therapy for more than 3 months: Yes   6. Does patient have atrial fibrillation: NA         This note was scribed in the presence of Dr. Letty Heaton by Caity Nielsen RN.      The scribes docuementation has been prepared under my direction and personally reviewed by me in its entirety. I confirm that the note above accurately reflects all work, treatment, procedures, and medical decision making performed by me. All questions and concerns were addressed to the patient/family. Alternatives to my treatment were discussed. PK Dior F.A.C.C. [place] [address]. [address] [place] [address]. [address].   [place], [address]  Phone: [phone]  Fax: [phone]

## 2018-12-26 ENCOUNTER — HOSPITAL ENCOUNTER (OUTPATIENT)
Age: 63
Discharge: HOME | End: 2018-12-26
Payer: MEDICARE

## 2018-12-26 DIAGNOSIS — I25.110 CORONARY ARTERY DISEASE INVOLVING NATIVE CORONARY ARTERY OF NATIVE HEART WITH UNSTABLE ANGINA PECTORIS (HCC): ICD-10-CM

## 2018-12-26 DIAGNOSIS — I10 ESSENTIAL HYPERTENSION: ICD-10-CM

## 2018-12-26 DIAGNOSIS — I25.5 ISCHEMIC CARDIOMYOPATHY: ICD-10-CM

## 2018-12-26 LAB
A/G RATIO: 1.8 (ref 1.1–2.2)
ALBUMIN SERPL-MCNC: 4.5 G/DL (ref 3.4–5)
ALP BLD-CCNC: 57 U/L (ref 40–129)
ALT SERPL-CCNC: 27 U/L (ref 10–40)
ANION GAP SERPL CALCULATED.3IONS-SCNC: 16 MMOL/L (ref 3–16)
AST SERPL-CCNC: 23 U/L (ref 15–37)
BILIRUB SERPL-MCNC: 0.4 MG/DL (ref 0–1)
BUN BLDV-MCNC: 13 MG/DL (ref 7–20)
CALCIUM SERPL-MCNC: 9.5 MG/DL (ref 8.3–10.6)
CHLORIDE BLD-SCNC: 99 MMOL/L (ref 99–110)
CHOLESTEROL, TOTAL: 140 MG/DL (ref 0–199)
CO2: 25 MMOL/L (ref 21–32)
CREAT SERPL-MCNC: 0.7 MG/DL (ref 0.8–1.3)
GFR AFRICAN AMERICAN: >60
GFR NON-AFRICAN AMERICAN: >60
GLOBULIN: 2.5 G/DL
GLUCOSE BLD-MCNC: 118 MG/DL (ref 70–99)
HDLC SERPL-MCNC: 30 MG/DL (ref 40–60)
LDL CHOLESTEROL CALCULATED: 71 MG/DL
POTASSIUM SERPL-SCNC: 3.7 MMOL/L (ref 3.5–5.1)
SODIUM BLD-SCNC: 140 MMOL/L (ref 136–145)
TOTAL PROTEIN: 7 G/DL (ref 6.4–8.2)
TRIGL SERPL-MCNC: 194 MG/DL (ref 0–150)
VLDLC SERPL CALC-MCNC: 39 MG/DL

## 2018-12-26 PROCEDURE — 36415 COLL VENOUS BLD VENIPUNCTURE: CPT

## 2018-12-26 PROCEDURE — 80053 COMPREHEN METABOLIC PANEL: CPT

## 2018-12-26 PROCEDURE — 80061 LIPID PANEL: CPT

## 2018-12-31 ENCOUNTER — TELEPHONE (OUTPATIENT)
Dept: CARDIOLOGY CLINIC | Age: 63
End: 2018-12-31

## 2018-12-31 NOTE — TELEPHONE ENCOUNTER
Spoke to pt with lab results. Pt has a f/u with SMM on 2/5/19 @ 8:30am at [facility] [place].

## 2019-01-23 RX ORDER — LISINOPRIL 10 MG/1
10 TABLET ORAL 2 TIMES DAILY
Qty: 180 TABLET | Refills: 0 | Status: SHIPPED | OUTPATIENT
Start: 2019-01-23 | End: 2019-03-13 | Stop reason: SDUPTHER

## 2019-01-23 RX ORDER — CLOPIDOGREL BISULFATE 75 MG/1
75 TABLET ORAL DAILY
Qty: 90 TABLET | Refills: 0 | Status: SHIPPED | OUTPATIENT
Start: 2019-01-23 | End: 2019-03-13 | Stop reason: SDUPTHER

## 2019-01-23 RX ORDER — CARVEDILOL 12.5 MG/1
12.5 TABLET ORAL 2 TIMES DAILY
Qty: 180 TABLET | Refills: 0 | Status: SHIPPED | OUTPATIENT
Start: 2019-01-23 | End: 2019-03-13 | Stop reason: SDUPTHER

## 2019-01-23 RX ORDER — ATORVASTATIN CALCIUM 80 MG/1
80 TABLET, FILM COATED ORAL NIGHTLY
Qty: 90 TABLET | Refills: 0 | Status: SHIPPED | OUTPATIENT
Start: 2019-01-23 | End: 2019-03-13 | Stop reason: SDUPTHER

## 2019-01-29 ENCOUNTER — NURSE ONLY (OUTPATIENT)
Dept: CARDIOLOGY CLINIC | Age: 64
End: 2019-01-29
Payer: MEDICARE

## 2019-01-29 DIAGNOSIS — Z95.810 ICD (IMPLANTABLE CARDIOVERTER-DEFIBRILLATOR) IN PLACE: Primary | ICD-10-CM

## 2019-01-29 DIAGNOSIS — I49.5 SINUS NODE DYSFUNCTION (HCC): ICD-10-CM

## 2019-01-29 DIAGNOSIS — I50.22 SYSTOLIC CHF, CHRONIC (HCC): ICD-10-CM

## 2019-01-29 DIAGNOSIS — I47.20 VENTRICULAR TACHYCARDIA: ICD-10-CM

## 2019-01-29 DIAGNOSIS — I25.5 ISCHEMIC CARDIOMYOPATHY: ICD-10-CM

## 2019-01-29 PROCEDURE — 93295 DEV INTERROG REMOTE 1/2/MLT: CPT | Performed by: INTERNAL MEDICINE

## 2019-01-29 PROCEDURE — 93296 REM INTERROG EVL PM/IDS: CPT | Performed by: INTERNAL MEDICINE

## 2019-01-29 PROCEDURE — 93297 REM INTERROG DEV EVAL ICPMS: CPT | Performed by: INTERNAL MEDICINE

## 2019-02-01 ENCOUNTER — TELEPHONE (OUTPATIENT)
Dept: CARDIOLOGY CLINIC | Age: 64
End: 2019-02-01

## 2019-02-05 ENCOUNTER — OFFICE VISIT (OUTPATIENT)
Dept: CARDIOLOGY CLINIC | Age: 64
End: 2019-02-05
Payer: MEDICARE

## 2019-02-05 VITALS
OXYGEN SATURATION: 96 % | HEIGHT: 68 IN | BODY MASS INDEX: 26.67 KG/M2 | DIASTOLIC BLOOD PRESSURE: 68 MMHG | HEART RATE: 77 BPM | WEIGHT: 176 LBS | SYSTOLIC BLOOD PRESSURE: 108 MMHG

## 2019-02-05 DIAGNOSIS — Z95.0 CARDIAC PACEMAKER IN SITU: ICD-10-CM

## 2019-02-05 DIAGNOSIS — I25.5 ISCHEMIC CARDIOMYOPATHY: ICD-10-CM

## 2019-02-05 DIAGNOSIS — I25.110 CORONARY ARTERY DISEASE INVOLVING NATIVE CORONARY ARTERY OF NATIVE HEART WITH UNSTABLE ANGINA PECTORIS (HCC): Primary | ICD-10-CM

## 2019-02-05 PROCEDURE — G8598 ASA/ANTIPLAT THER USED: HCPCS | Performed by: INTERNAL MEDICINE

## 2019-02-05 PROCEDURE — G8427 DOCREV CUR MEDS BY ELIG CLIN: HCPCS | Performed by: INTERNAL MEDICINE

## 2019-02-05 PROCEDURE — 99214 OFFICE O/P EST MOD 30 MIN: CPT | Performed by: INTERNAL MEDICINE

## 2019-02-05 PROCEDURE — G8417 CALC BMI ABV UP PARAM F/U: HCPCS | Performed by: INTERNAL MEDICINE

## 2019-02-05 PROCEDURE — 1101F PT FALLS ASSESS-DOCD LE1/YR: CPT | Performed by: INTERNAL MEDICINE

## 2019-02-05 PROCEDURE — 4040F PNEUMOC VAC/ADMIN/RCVD: CPT | Performed by: INTERNAL MEDICINE

## 2019-02-05 PROCEDURE — 4004F PT TOBACCO SCREEN RCVD TLK: CPT | Performed by: INTERNAL MEDICINE

## 2019-02-05 PROCEDURE — 1123F ACP DISCUSS/DSCN MKR DOCD: CPT | Performed by: INTERNAL MEDICINE

## 2019-02-05 PROCEDURE — G8484 FLU IMMUNIZE NO ADMIN: HCPCS | Performed by: INTERNAL MEDICINE

## 2019-02-05 PROCEDURE — 3017F COLORECTAL CA SCREEN DOC REV: CPT | Performed by: INTERNAL MEDICINE

## 2019-02-07 ENCOUNTER — TELEPHONE (OUTPATIENT)
Dept: CARDIOLOGY CLINIC | Age: 64
End: 2019-02-07

## 2019-03-13 RX ORDER — CLOPIDOGREL BISULFATE 75 MG/1
75 TABLET ORAL DAILY
Qty: 90 TABLET | Refills: 2 | Status: SHIPPED | OUTPATIENT
Start: 2019-03-13 | End: 2019-10-08 | Stop reason: SDUPTHER

## 2019-03-13 RX ORDER — CARVEDILOL 12.5 MG/1
TABLET ORAL
Qty: 180 TABLET | Refills: 2 | Status: SHIPPED | OUTPATIENT
Start: 2019-03-13 | End: 2019-10-08 | Stop reason: SDUPTHER

## 2019-03-13 RX ORDER — ATORVASTATIN CALCIUM 80 MG/1
80 TABLET, FILM COATED ORAL NIGHTLY
Qty: 90 TABLET | Refills: 2 | Status: SHIPPED | OUTPATIENT
Start: 2019-03-13 | End: 2019-10-08 | Stop reason: SDUPTHER

## 2019-03-13 RX ORDER — LISINOPRIL 10 MG/1
10 TABLET ORAL DAILY
Qty: 180 TABLET | Refills: 2 | Status: SHIPPED | OUTPATIENT
Start: 2019-03-13 | End: 2019-10-08 | Stop reason: SDUPTHER

## 2019-03-25 ENCOUNTER — TELEPHONE (OUTPATIENT)
Dept: CARDIOLOGY CLINIC | Age: 64
End: 2019-03-25

## 2019-03-25 ENCOUNTER — HOSPITAL ENCOUNTER (EMERGENCY)
Age: 64
Discharge: HOME | End: 2019-03-25
Attending: EMERGENCY MEDICINE
Payer: MEDICARE

## 2019-03-25 ENCOUNTER — PROCEDURE VISIT (OUTPATIENT)
Dept: CARDIOLOGY CLINIC | Age: 64
End: 2019-03-25

## 2019-03-25 ENCOUNTER — APPOINTMENT (OUTPATIENT)
Dept: GENERAL RADIOLOGY | Age: 64
End: 2019-03-25
Payer: MEDICARE

## 2019-03-25 VITALS
TEMPERATURE: 97.1 F | WEIGHT: 176 LBS | SYSTOLIC BLOOD PRESSURE: 154 MMHG | RESPIRATION RATE: 16 BRPM | DIASTOLIC BLOOD PRESSURE: 87 MMHG | HEART RATE: 64 BPM | BODY MASS INDEX: 26.76 KG/M2 | OXYGEN SATURATION: 97 %

## 2019-03-25 DIAGNOSIS — Z45.02 ENCOUNTER FOR CHECKING OF AUTOMATIC IMPLANTABLE CARDIOVERTER-DEFIBRILLATOR (AICD): Primary | ICD-10-CM

## 2019-03-25 DIAGNOSIS — Z95.810 ICD (IMPLANTABLE CARDIOVERTER-DEFIBRILLATOR) IN PLACE: ICD-10-CM

## 2019-03-25 LAB
A/G RATIO: 1.7 (ref 1.1–2.2)
ALBUMIN SERPL-MCNC: 3.9 G/DL (ref 3.4–5)
ALP BLD-CCNC: 50 U/L (ref 40–129)
ALT SERPL-CCNC: 19 U/L (ref 10–40)
ANION GAP SERPL CALCULATED.3IONS-SCNC: 9 MMOL/L (ref 3–16)
AST SERPL-CCNC: 24 U/L (ref 15–37)
BASOPHILS ABSOLUTE: 0.1 K/UL (ref 0–0.2)
BASOPHILS RELATIVE PERCENT: 0.8 %
BILIRUB SERPL-MCNC: 0.4 MG/DL (ref 0–1)
BUN BLDV-MCNC: 15 MG/DL (ref 7–20)
CALCIUM SERPL-MCNC: 9 MG/DL (ref 8.3–10.6)
CHLORIDE BLD-SCNC: 104 MMOL/L (ref 99–110)
CO2: 26 MMOL/L (ref 21–32)
CREAT SERPL-MCNC: 0.8 MG/DL (ref 0.8–1.3)
EKG ATRIAL RATE: 62 BPM
EKG DIAGNOSIS: NORMAL
EKG P AXIS: 62 DEGREES
EKG P-R INTERVAL: 212 MS
EKG Q-T INTERVAL: 414 MS
EKG QRS DURATION: 100 MS
EKG QTC CALCULATION (BAZETT): 420 MS
EKG R AXIS: -33 DEGREES
EKG T AXIS: 161 DEGREES
EKG VENTRICULAR RATE: 62 BPM
EOSINOPHILS ABSOLUTE: 0.1 K/UL (ref 0–0.6)
EOSINOPHILS RELATIVE PERCENT: 0.9 %
GFR AFRICAN AMERICAN: >60
GFR NON-AFRICAN AMERICAN: >60
GLOBULIN: 2.3 G/DL
GLUCOSE BLD-MCNC: 122 MG/DL (ref 70–99)
HCT VFR BLD CALC: 40.4 % (ref 40.5–52.5)
HEMOGLOBIN: 13.9 G/DL (ref 13.5–17.5)
LYMPHOCYTES ABSOLUTE: 2 K/UL (ref 1–5.1)
LYMPHOCYTES RELATIVE PERCENT: 23.4 %
MCH RBC QN AUTO: 30.5 PG (ref 26–34)
MCHC RBC AUTO-ENTMCNC: 34.3 G/DL (ref 31–36)
MCV RBC AUTO: 89.1 FL (ref 80–100)
MONOCYTES ABSOLUTE: 0.7 K/UL (ref 0–1.3)
MONOCYTES RELATIVE PERCENT: 7.7 %
NEUTROPHILS ABSOLUTE: 5.8 K/UL (ref 1.7–7.7)
NEUTROPHILS RELATIVE PERCENT: 67.2 %
PDW BLD-RTO: 14.1 % (ref 12.4–15.4)
PLATELET # BLD: 126 K/UL (ref 135–450)
PMV BLD AUTO: 10.8 FL (ref 5–10.5)
POTASSIUM SERPL-SCNC: 4.3 MMOL/L (ref 3.5–5.1)
RBC # BLD: 4.54 M/UL (ref 4.2–5.9)
SODIUM BLD-SCNC: 139 MMOL/L (ref 136–145)
TOTAL PROTEIN: 6.2 G/DL (ref 6.4–8.2)
TROPONIN: <0.01 NG/ML
WBC # BLD: 8.6 K/UL (ref 4–11)

## 2019-03-25 PROCEDURE — 84484 ASSAY OF TROPONIN QUANT: CPT

## 2019-03-25 PROCEDURE — 71046 X-RAY EXAM CHEST 2 VIEWS: CPT

## 2019-03-25 PROCEDURE — 93005 ELECTROCARDIOGRAM TRACING: CPT | Performed by: EMERGENCY MEDICINE

## 2019-03-25 PROCEDURE — 80053 COMPREHEN METABOLIC PANEL: CPT

## 2019-03-25 PROCEDURE — 85025 COMPLETE CBC W/AUTO DIFF WBC: CPT

## 2019-03-25 PROCEDURE — 99284 EMERGENCY DEPT VISIT MOD MDM: CPT

## 2019-03-26 ENCOUNTER — TELEPHONE (OUTPATIENT)
Dept: CARDIOLOGY CLINIC | Age: 64
End: 2019-03-26

## 2019-03-29 ENCOUNTER — PROCEDURE VISIT (OUTPATIENT)
Dept: CARDIOLOGY CLINIC | Age: 64
End: 2019-03-29
Payer: MEDICARE

## 2019-03-29 DIAGNOSIS — Z95.810 ICD (IMPLANTABLE CARDIOVERTER-DEFIBRILLATOR) IN PLACE: ICD-10-CM

## 2019-03-29 DIAGNOSIS — I50.22 SYSTOLIC CHF, CHRONIC (HCC): ICD-10-CM

## 2019-03-29 DIAGNOSIS — I47.20 VENTRICULAR TACHYCARDIA: ICD-10-CM

## 2019-03-29 DIAGNOSIS — I49.5 SINUS NODE DYSFUNCTION (HCC): ICD-10-CM

## 2019-03-29 PROCEDURE — 93283 PRGRMG EVAL IMPLANTABLE DFB: CPT | Performed by: INTERNAL MEDICINE

## 2019-04-30 ENCOUNTER — NURSE ONLY (OUTPATIENT)
Dept: CARDIOLOGY CLINIC | Age: 64
End: 2019-04-30

## 2019-04-30 DIAGNOSIS — Z95.810 ICD (IMPLANTABLE CARDIOVERTER-DEFIBRILLATOR) IN PLACE: ICD-10-CM

## 2019-05-01 NOTE — PROGRESS NOTES
Carelink transmission shows normal sensing and pacing function. See interrogation for more details. 1 NSVT X 8 beats, takes Coreg  Thoracic impedance trend stable. Follow up in 3 months via carelink.

## 2019-07-02 ENCOUNTER — NURSE ONLY (OUTPATIENT)
Dept: CARDIOLOGY CLINIC | Age: 64
End: 2019-07-02
Payer: MEDICARE

## 2019-07-02 DIAGNOSIS — I47.20 VENTRICULAR TACHYCARDIA: ICD-10-CM

## 2019-07-02 DIAGNOSIS — Z95.810 ICD (IMPLANTABLE CARDIOVERTER-DEFIBRILLATOR) IN PLACE: ICD-10-CM

## 2019-07-02 DIAGNOSIS — I50.22 SYSTOLIC CHF, CHRONIC (HCC): ICD-10-CM

## 2019-07-02 DIAGNOSIS — I25.5 ISCHEMIC CARDIOMYOPATHY: ICD-10-CM

## 2019-07-02 PROCEDURE — 93296 REM INTERROG EVL PM/IDS: CPT | Performed by: INTERNAL MEDICINE

## 2019-07-02 PROCEDURE — 93297 REM INTERROG DEV EVAL ICPMS: CPT | Performed by: INTERNAL MEDICINE

## 2019-07-02 PROCEDURE — 93295 DEV INTERROG REMOTE 1/2/MLT: CPT | Performed by: INTERNAL MEDICINE

## 2019-07-02 NOTE — PROGRESS NOTES
Carelink transmission shows normal sensing and pacing function. See interrogation for more details. 99.8% ap  <0.1%   OPTIVOL at baseline  3 NSVT brief-7-8  Beats (coreg)   OV [facility] 10/22/19    See PACEART report under Cardiology tab.

## 2019-08-07 ENCOUNTER — TELEPHONE (OUTPATIENT)
Dept: CARDIOLOGY CLINIC | Age: 64
End: 2019-08-07

## 2019-08-07 NOTE — LETTER
[address] Cardiology - [address] [facility] [address] [facility]  Phone: [phone]  Fax: [phone]    Michelle Montaño MD        August 8, 2019     Milad Bey  [address] [place] [address]  1955      To whom it may concern,         Milad Bey is considered Intermediate-high risk clinically for lower risk surgery. OK to proceed as planned and OK to hold plavix for least amount possible per surgeon's recs. Please continue baby aspirin if possible  If you have any questions or concerns, please don't hesitate to call.     Sincerely,        Michelle Montaño MD

## 2019-08-07 NOTE — TELEPHONE ENCOUNTER
CARDIAC CLEARANCE REQUEST    What type of procedure are you having: hernia surgery    Are you taking any blood thinners: clopidogrel (PLAVIX) 75 MG tablet and ASA      When is your procedure scheduled for:8/21    What physician is performing your procedure: Dr Clement Padilla    Phone Number:    Fax number to send the letter:  Does pt need to be seen by [facility] prior to surgery? Please inform Dr Jackson Elliott office . LOV 2/1/19  Plan:  1. Continue current medications  2. Follow up with me 9 months  3. No cardiac testing at this time   4. Recommend seeing your PCP for preventive measures. He only goes to PCP when sick. Strongly encouraged routine OV for preventive measures.

## 2019-10-08 ENCOUNTER — NURSE ONLY (OUTPATIENT)
Dept: CARDIOLOGY CLINIC | Age: 64
End: 2019-10-08
Payer: MEDICARE

## 2019-10-08 ENCOUNTER — OFFICE VISIT (OUTPATIENT)
Dept: CARDIOLOGY CLINIC | Age: 64
End: 2019-10-08
Payer: MEDICARE

## 2019-10-08 VITALS
OXYGEN SATURATION: 99 % | WEIGHT: 169 LBS | HEIGHT: 68 IN | BODY MASS INDEX: 25.61 KG/M2 | HEART RATE: 62 BPM | SYSTOLIC BLOOD PRESSURE: 138 MMHG | DIASTOLIC BLOOD PRESSURE: 82 MMHG

## 2019-10-08 DIAGNOSIS — I50.22 SYSTOLIC CHF, CHRONIC (HCC): ICD-10-CM

## 2019-10-08 DIAGNOSIS — I48.0 PAF (PAROXYSMAL ATRIAL FIBRILLATION) (HCC): ICD-10-CM

## 2019-10-08 DIAGNOSIS — I25.110 CORONARY ARTERY DISEASE INVOLVING NATIVE CORONARY ARTERY OF NATIVE HEART WITH UNSTABLE ANGINA PECTORIS (HCC): Primary | ICD-10-CM

## 2019-10-08 DIAGNOSIS — I25.5 ISCHEMIC CARDIOMYOPATHY: ICD-10-CM

## 2019-10-08 DIAGNOSIS — I10 ESSENTIAL HYPERTENSION: ICD-10-CM

## 2019-10-08 DIAGNOSIS — I49.5 SINUS NODE DYSFUNCTION (HCC): ICD-10-CM

## 2019-10-08 DIAGNOSIS — Z95.810 ICD (IMPLANTABLE CARDIOVERTER-DEFIBRILLATOR) IN PLACE: ICD-10-CM

## 2019-10-08 DIAGNOSIS — I47.20 VENTRICULAR TACHYCARDIA: ICD-10-CM

## 2019-10-08 PROCEDURE — G8484 FLU IMMUNIZE NO ADMIN: HCPCS | Performed by: INTERNAL MEDICINE

## 2019-10-08 PROCEDURE — 3017F COLORECTAL CA SCREEN DOC REV: CPT | Performed by: INTERNAL MEDICINE

## 2019-10-08 PROCEDURE — 93290 INTERROG DEV EVAL ICPMS IP: CPT | Performed by: INTERNAL MEDICINE

## 2019-10-08 PROCEDURE — 93283 PRGRMG EVAL IMPLANTABLE DFB: CPT | Performed by: INTERNAL MEDICINE

## 2019-10-08 PROCEDURE — 1123F ACP DISCUSS/DSCN MKR DOCD: CPT | Performed by: INTERNAL MEDICINE

## 2019-10-08 PROCEDURE — 4004F PT TOBACCO SCREEN RCVD TLK: CPT | Performed by: INTERNAL MEDICINE

## 2019-10-08 PROCEDURE — 4040F PNEUMOC VAC/ADMIN/RCVD: CPT | Performed by: INTERNAL MEDICINE

## 2019-10-08 PROCEDURE — G8598 ASA/ANTIPLAT THER USED: HCPCS | Performed by: INTERNAL MEDICINE

## 2019-10-08 PROCEDURE — 99214 OFFICE O/P EST MOD 30 MIN: CPT | Performed by: INTERNAL MEDICINE

## 2019-10-08 PROCEDURE — G8427 DOCREV CUR MEDS BY ELIG CLIN: HCPCS | Performed by: INTERNAL MEDICINE

## 2019-10-08 PROCEDURE — G8417 CALC BMI ABV UP PARAM F/U: HCPCS | Performed by: INTERNAL MEDICINE

## 2019-10-08 RX ORDER — LISINOPRIL 10 MG/1
10 TABLET ORAL DAILY
Qty: 180 TABLET | Refills: 3 | Status: SHIPPED | OUTPATIENT
Start: 2019-10-08 | End: 2020-10-12

## 2019-10-08 RX ORDER — CLOPIDOGREL BISULFATE 75 MG/1
75 TABLET ORAL DAILY
Qty: 90 TABLET | Refills: 3 | Status: SHIPPED | OUTPATIENT
Start: 2019-10-08 | End: 2020-08-10

## 2019-10-08 RX ORDER — ATORVASTATIN CALCIUM 80 MG/1
80 TABLET, FILM COATED ORAL NIGHTLY
Qty: 90 TABLET | Refills: 3 | Status: SHIPPED | OUTPATIENT
Start: 2019-10-08 | End: 2020-08-10

## 2019-10-08 RX ORDER — CARVEDILOL 12.5 MG/1
TABLET ORAL
Qty: 180 TABLET | Refills: 3 | Status: SHIPPED | OUTPATIENT
Start: 2019-10-08 | End: 2020-08-07 | Stop reason: SDUPTHER

## 2020-01-28 ENCOUNTER — NURSE ONLY (OUTPATIENT)
Dept: CARDIOLOGY CLINIC | Age: 65
End: 2020-01-28
Payer: MEDICARE

## 2020-01-28 PROCEDURE — 93296 REM INTERROG EVL PM/IDS: CPT | Performed by: INTERNAL MEDICINE

## 2020-01-28 PROCEDURE — 93295 DEV INTERROG REMOTE 1/2/MLT: CPT | Performed by: INTERNAL MEDICINE

## 2020-01-28 NOTE — PROGRESS NOTES
Carelink transmission shows normal sensing and pacing function. See interrogation for more details. Episodes Since: 08-Oct-2019  5 Non-sustained VT, longest 10  Sec (coreg)  optivol at baseline.   Ov smm  4/3

## 2020-07-28 ENCOUNTER — HOSPITAL ENCOUNTER (OUTPATIENT)
Age: 65
Discharge: HOME | End: 2020-07-28
Payer: MEDICARE

## 2020-07-28 LAB
A/G RATIO: 1.6 (ref 1.1–2.2)
ALBUMIN SERPL-MCNC: 4 G/DL (ref 3.4–5)
ALP BLD-CCNC: 53 U/L (ref 40–129)
ALT SERPL-CCNC: 14 U/L (ref 10–40)
ANION GAP SERPL CALCULATED.3IONS-SCNC: 12 MMOL/L (ref 3–16)
AST SERPL-CCNC: 17 U/L (ref 15–37)
BILIRUB SERPL-MCNC: 0.4 MG/DL (ref 0–1)
BUN BLDV-MCNC: 18 MG/DL (ref 7–20)
CALCIUM SERPL-MCNC: 9.2 MG/DL (ref 8.3–10.6)
CHLORIDE BLD-SCNC: 103 MMOL/L (ref 99–110)
CHOLESTEROL, TOTAL: 109 MG/DL (ref 0–199)
CO2: 26 MMOL/L (ref 21–32)
CREAT SERPL-MCNC: 0.8 MG/DL (ref 0.8–1.3)
GFR AFRICAN AMERICAN: >60
GFR NON-AFRICAN AMERICAN: >60
GLOBULIN: 2.5 G/DL
GLUCOSE BLD-MCNC: 103 MG/DL (ref 70–99)
HCT VFR BLD CALC: 43 % (ref 40.5–52.5)
HDLC SERPL-MCNC: 28 MG/DL (ref 40–60)
HEMOGLOBIN: 14.5 G/DL (ref 13.5–17.5)
LDL CHOLESTEROL CALCULATED: 48 MG/DL
MCH RBC QN AUTO: 30.4 PG (ref 26–34)
MCHC RBC AUTO-ENTMCNC: 33.7 G/DL (ref 31–36)
MCV RBC AUTO: 90.3 FL (ref 80–100)
PDW BLD-RTO: 13.7 % (ref 12.4–15.4)
PLATELET # BLD: 134 K/UL (ref 135–450)
PMV BLD AUTO: 11.3 FL (ref 5–10.5)
POTASSIUM SERPL-SCNC: 3.9 MMOL/L (ref 3.5–5.1)
RBC # BLD: 4.76 M/UL (ref 4.2–5.9)
SODIUM BLD-SCNC: 141 MMOL/L (ref 136–145)
TOTAL PROTEIN: 6.5 G/DL (ref 6.4–8.2)
TRIGL SERPL-MCNC: 167 MG/DL (ref 0–150)
VLDLC SERPL CALC-MCNC: 33 MG/DL
WBC # BLD: 5.4 K/UL (ref 4–11)

## 2020-07-28 PROCEDURE — 85027 COMPLETE CBC AUTOMATED: CPT

## 2020-07-28 PROCEDURE — 80061 LIPID PANEL: CPT

## 2020-07-28 PROCEDURE — 36415 COLL VENOUS BLD VENIPUNCTURE: CPT

## 2020-07-28 PROCEDURE — 80053 COMPREHEN METABOLIC PANEL: CPT

## 2020-07-30 NOTE — PROGRESS NOTES
Patient presents to the device clinic today for a programming evaluation for his defibrillator. Patient has a history of VT, CM, SND, and PAF. Takes Eliquis, Coreg, and Plavix. Last device interrogation was on 1/28. Since then, ***. All sensing and pacing parameters are within normal range. No changes need to be made at this time. Patient education was provided about device functionality, in home monitoring, and any other patient questions and/or concerns were addressed. Patient voices understanding. Please see interrogation for more detail. Patient will see Dr. Svitlana Mendoza today in clinic. Patient will follow up in 3 months in office or remotely. See Paceart report under the Cardiology tab.

## 2020-08-03 ENCOUNTER — OFFICE VISIT (OUTPATIENT)
Dept: CARDIOLOGY CLINIC | Age: 65
End: 2020-08-03
Payer: MEDICARE

## 2020-08-03 ENCOUNTER — NURSE ONLY (OUTPATIENT)
Dept: CARDIOLOGY CLINIC | Age: 65
End: 2020-08-03
Payer: MEDICARE

## 2020-08-03 VITALS
SYSTOLIC BLOOD PRESSURE: 134 MMHG | HEIGHT: 68 IN | DIASTOLIC BLOOD PRESSURE: 72 MMHG | HEART RATE: 62 BPM | BODY MASS INDEX: 25.85 KG/M2 | WEIGHT: 170.6 LBS | OXYGEN SATURATION: 98 %

## 2020-08-03 PROCEDURE — G8427 DOCREV CUR MEDS BY ELIG CLIN: HCPCS | Performed by: INTERNAL MEDICINE

## 2020-08-03 PROCEDURE — G8417 CALC BMI ABV UP PARAM F/U: HCPCS | Performed by: INTERNAL MEDICINE

## 2020-08-03 PROCEDURE — 99214 OFFICE O/P EST MOD 30 MIN: CPT | Performed by: INTERNAL MEDICINE

## 2020-08-03 PROCEDURE — 93290 INTERROG DEV EVAL ICPMS IP: CPT | Performed by: INTERNAL MEDICINE

## 2020-08-03 PROCEDURE — 4040F PNEUMOC VAC/ADMIN/RCVD: CPT | Performed by: INTERNAL MEDICINE

## 2020-08-03 PROCEDURE — 93289 INTERROG DEVICE EVAL HEART: CPT | Performed by: INTERNAL MEDICINE

## 2020-08-03 PROCEDURE — 3017F COLORECTAL CA SCREEN DOC REV: CPT | Performed by: INTERNAL MEDICINE

## 2020-08-03 PROCEDURE — 1123F ACP DISCUSS/DSCN MKR DOCD: CPT | Performed by: INTERNAL MEDICINE

## 2020-08-03 PROCEDURE — 4004F PT TOBACCO SCREEN RCVD TLK: CPT | Performed by: INTERNAL MEDICINE

## 2020-08-03 NOTE — LETTER
[address]   Cardiac Followup    Referring Provider:  Khadar Foster MD     Chief Complaint   Patient presents with    Follow-up    Coronary Artery Disease      Subjective: Patient being seen today for cardiology follow up of CAD, CMP, HTN, smoker, s/p ICD; no complaints today    History of Present Illness:   Mr. Ferdinand Cheng is a 66yo male who has hx CAD s/p NSTEMI on 03/12/2015, s/p 2V CABG 10/15, ischemic CM (EF=30-35%), and s/p dual chamber/ICD upgrade 7/17. In May 2013 S/P anterior STEMI with subtotal occlusion with thrombus in LAD at 1st septal  branch. Successful OANH placed to LAD; LVEF=50%. Post-procedure he developed symptomatic junctional rhythm and sinus bradycardia requiring dual chamber pacemaker insertion on 5/24/13. Diagnosed with NSTEMI in March 2015 with peak TnI=67. He underwent cardiac that showed EF 25-30%. He had PCI S/P cutting balloon PTCA and BMS placement to LAD for ISR. Note he underwent PM device check in July and October 2015 that showed NSVT. The NSVT worsened and c/o chest pain. Subsequently, underwent most recent cardiac cath 10/9/15 that showed recurrent ISR of LAD and CABG deemed necessary. He is s/p 2V CABG with LIMA-LAD and SVG-distal RCA on 10/14/15. Carotid doppler 10/9/15 less than 50% bilaterally. Most recent lexiscan myoview stress test 2/9/17 LVEF of 34%. Severe global wall motion with AK of the apical sepal wall; large sized severe defect in  the mid/apical anterior, septal, and entire inferior wall at stress and rest c/w scar. No significant ischemia was seen. Most recent ECHO 2/9/17 mild concentric LVH, EF of 30-35%; AK LAD territory;  Grade II DD with elevated filling pressure; mild MR; Mild AS; Moderate AI. Due to continued low EF despite CHF med titration I referred him to Dr Sumit Herrmann and he upgraded him on 7/12/17 to dual chamber PM/ICD.  Most recent device check 8/3/20 shows normal function AP 99.6%  0.1%; thoracic impedance stable; 1 episode AT/AF on 6/21 for 3 mins (PAF); 22 episodes NSVT since 10/19. He reported having inguinal  hernia repair in Aug. 2019 at [facility] with Dr. Madeleine Woodruff. Today he reports feeling good. He reports he has been mostly staying at home and avoiding crowds. He states he continues to walk 10 miles daily. He continues to smoke 10 cigarettes daily. He never started on Eliquis due to the cost and is aware of the risk of stroke and REFUSES to take NOAC or coumadin. He only wants to continue DAPT. Patient denies chest pain, sob, palpitations, dizziness or syncope. Past Medical History:     has a past medical history of CAD (coronary artery disease), COPD (chronic obstructive pulmonary disease) ([facility] [address] [facility] Hypertension, and Systolic CHF, chronic ([facility] [address] [facility] Surgical History:   has a past surgical history that includes other surgical history (1999); Coronary angioplasty with stent (05/21/2013); pacemaker placement; Coronary artery bypass graft (10/14/2015); and hernia repair (08/2019). Social History:   He is  and lives at [facility] with his wife. He is retired. He was a self employed . Smoker. He reports that he drinks about 2-3 beers a few days a week. He reports that he does not use illicit drugs. Family History:  family history includes Diabetes in his mother; High Blood Pressure in his father, mother, and sister; Geoffrey Sermons / [place] in his mother. Home Medications:  Prior to Admission medications    Medication Sig Start Date End Date Taking? Authorizing Provider   aspirin 81 MG chewable tablet Take 1 tablet by mouth daily. 5/25/13  Yes Dayton Sen CNP   clopidogrel (PLAVIX) 75 MG tablet Take 1 tablet by mouth daily. 5/25/13  Yes Brittany Moody CNP   lisinopril (PRINIVIL;ZESTRIL) 5 MG tablet Take 1 tablet by mouth daily.  5/25/13  Yes Dayton Sen CNP · Normal S1S2, No S3, Soft 2/6 LAST  · Peripheral pulses are symmetrical and full  · There is no clubbing, cyanosis of the extremities.   · No edema  · Femoral Arteries: 2+ and equal  · Pedal Pulses: 2+ and equal   Abdomen:  · No masses or tenderness  · Liver/Spleen: No Abnormalities Noted  Neurological/Psychiatric:  · Alert and oriented in all spheres  · Moves all extremities well  · Exhibits normal gait balance and coordination  · No abnormalities of mood, affect, memory, mentation, or behavior are noted  ·   Lab Results   Component Value Date    CHOL 109 07/28/2020    CHOL 140 12/26/2018    CHOL 122 11/07/2017     Lab Results   Component Value Date    TRIG 167 (H) 07/28/2020    TRIG 194 (H) 12/26/2018    TRIG 134 11/07/2017     Lab Results   Component Value Date    HDL 28 (L) 07/28/2020    HDL 30 (L) 12/26/2018    HDL 27 (L) 11/07/2017     Lab Results   Component Value Date    LDLCALC 48 07/28/2020    LDLCALC 71 12/26/2018    LDLCALC 68 11/07/2017     Lab Results   Component Value Date    LABVLDL 33 07/28/2020    LABVLDL 39 12/26/2018    LABVLDL 27 11/07/2017     No results found for: CHOLHDLRATIO  Lab Results   Component Value Date    CREATININE 0.8 07/28/2020    BUN 18 07/28/2020     07/28/2020    K 3.9 07/28/2020     07/28/2020    CO2 26 07/28/2020     Lab Results   Component Value Date    ALT 14 07/28/2020    AST 17 07/28/2020    ALKPHOS 53 07/28/2020    BILITOT 0.4 07/28/2020       Lab Results   Component Value Date    CHOL 109 07/28/2020    CHOL 140 12/26/2018    CHOL 122 11/07/2017     Lab Results   Component Value Date    TRIG 167 (H) 07/28/2020    TRIG 194 (H) 12/26/2018    TRIG 134 11/07/2017     Lab Results   Component Value Date    HDL 28 (L) 07/28/2020    HDL 30 (L) 12/26/2018    HDL 27 (L) 11/07/2017     Lab Results   Component Value Date    LDLCALC 48 07/28/2020    LDLCALC 71 12/26/2018    LDLCALC 68 11/07/2017     Lab Results   Component Value Date    LABVLDL 33 07/28/2020 LABVLDL 39 12/26/2018    LABVLDL 27 11/07/2017     Assessment:     1. NSTEMI:  Resolved. Stable and will continue present medical regimen. Stable and will continue present medical regimen. There are no concerning symptoms for angina currently. I have strongly encouraged him to quit smoking entirely in past but he still has no interest.          2. CAD (coronary artery disease) Stable and will continue present medical regimen. He is s/p 2V CABG with LIMA-LAD and SVG-distal RCA on 10/14/15. Most recent stress test 2/9/17 LVEF of 34%; +scar without with no significant ischemia. 3. Cardiac pacemaker in situ:  Stable. Junctional rhythm and sinus bradycardia requiring dual chamber pacemaker insertion on 5/24/13. See # 2 above. I was concerned with large area of scar on stress testing, ischemic cardiomyopathy, and EF=30-35%. Therefore, he was upgraded him 7/12/17 to dual chamber ICD. Most recent device check 8/3/20 shows normal function AP 99.6%  0.1%; thoracic impedance stable; 1 episode AT/AF on 6/21 for 3 mins (PAF); 22 episodes NSVT since 10/19.       4.  I personally reviewed most recent lipids 7/28/20. Results above. Overall well controlled at goal except lower HDL which will treat due to no proven clinical benefit. 5.  Ischemic cardiomyopathy:  Most recent ECHO 2/9/17 mild concentric LVH, EF of 30-35%; apex and apical segments are akinetic. Akinesis of the anteroseptal wall. Grade II DD with elevated filling pressure; mild MR; Mild AS; Moderate AI. Clinically compensated NYHA Class II and will continue current CHF medical regimen. Clinically compensated NYHA Class II and will continue current CHF medical regimen. Plan:  1. Medication and stroke risk discussed in detail. Patient chooses to remain on Plavix only. 2. Continue current medication regimen. No changes  3. Follow up in 9 months     This note was scribed in the presence of Dr.Meyers CAMPA by Ronny Landa RN. I, Dr. Raquel Jeronimo, personally performed the services described in this documentation, as scribed by the above signed scribe in my presence. It is both accurate and complete to my knowledge. I agree with the details independently gathered by the clinical support staff, while the remaining scribed note accurately describes my personal service to the patient. Cost of prescription medications and patient compliance have been reviewed with patient. All questions answered. Luiza Alarcon.  Jyoti Nath M.D., [facility] [place]

## 2020-08-03 NOTE — PROGRESS NOTES
[address]   Cardiac Followup    Referring Provider:  Ubaldo Quigley MD     Chief Complaint   Patient presents with    Follow-up    Coronary Artery Disease      Subjective: Patient being seen today for cardiology follow up of CAD, CMP, HTN, smoker, s/p ICD; no complaints today    History of Present Illness:   Mr. Sachin Jensen is a 66yo male who has hx CAD s/p NSTEMI on 03/12/2015, s/p 2V CABG 10/15, ischemic CM (EF=30-35%), and s/p dual chamber/ICD upgrade 7/17. In May 2013 S/P anterior STEMI with subtotal occlusion with thrombus in LAD at 1st septal  branch. Successful OANH placed to LAD; LVEF=50%. Post-procedure he developed symptomatic junctional rhythm and sinus bradycardia requiring dual chamber pacemaker insertion on 5/24/13. Diagnosed with NSTEMI in March 2015 with peak TnI=67. He underwent cardiac that showed EF 25-30%. He had PCI S/P cutting balloon PTCA and BMS placement to LAD for ISR. Note he underwent PM device check in July and October 2015 that showed NSVT. The NSVT worsened and c/o chest pain. Subsequently, underwent most recent cardiac cath 10/9/15 that showed recurrent ISR of LAD and CABG deemed necessary. He is s/p 2V CABG with LIMA-LAD and SVG-distal RCA on 10/14/15. Carotid doppler 10/9/15 less than 50% bilaterally. Most recent lexiscan myoview stress test 2/9/17 LVEF of 34%. Severe global wall motion with AK of the apical sepal wall; large sized severe defect in  the mid/apical anterior, septal, and entire inferior wall at stress and rest c/w scar. No significant ischemia was seen. Most recent ECHO 2/9/17 mild concentric LVH, EF of 30-35%; AK LAD territory;  Grade II DD with elevated filling pressure; mild MR; Mild AS; Moderate AI. Due to continued low EF despite CHF med titration I referred him to Dr Lilibeth Juan and he upgraded him on 7/12/17 to dual chamber PM/ICD.  Most recent device check 8/3/20 shows normal function AP 99.6%  0.1%; thoracic impedance stable; 1 episode AT/AF on 6/21 for 3 mins (PAF); 22 episodes NSVT since 10/19. He reported having inguinal  hernia repair in Aug. 2019 at [facility] with Dr. Waylon Lloyd. Today he reports feeling good. He reports he has been mostly staying at home and avoiding crowds. He states he continues to walk 10 miles daily. He continues to smoke 10 cigarettes daily. He never started on Eliquis due to the cost and is aware of the risk of stroke and REFUSES to take NOAC or coumadin. He only wants to continue DAPT. Patient denies chest pain, sob, palpitations, dizziness or syncope. Past Medical History:     has a past medical history of CAD (coronary artery disease), COPD (chronic obstructive pulmonary disease) ([facility] [address] Hypertension, and Systolic CHF, chronic ([facility] [address] Surgical History:   has a past surgical history that includes other surgical history (1999); Coronary angioplasty with stent (05/21/2013); pacemaker placement; Coronary artery bypass graft (10/14/2015); and hernia repair (08/2019). Social History:   He is  and lives at [facility] with his wife. He is retired. He was a self employed . Smoker. He reports that he drinks about 2-3 beers a few days a week. He reports that he does not use illicit drugs. Family History:  family history includes Diabetes in his mother; High Blood Pressure in his father, mother, and sister; Barbarann Drilling / [place] in his mother. Home Medications:  Prior to Admission medications    Medication Sig Start Date End Date Taking? Authorizing Provider   aspirin 81 MG chewable tablet Take 1 tablet by mouth daily. 5/25/13  Yes Denise Irizarry CNP   clopidogrel (PLAVIX) 75 MG tablet Take 1 tablet by mouth daily. 5/25/13  Yes Brittany Moody CNP   lisinopril (PRINIVIL;ZESTRIL) 5 MG tablet Take 1 tablet by mouth daily. 5/25/13  Yes eDnise Irizarry CNP   metoprolol (LOPRESSOR) 25 MG tablet Take 1 tablet by mouth 2 times daily.  5/25/13  Yes Timbo Murray Gianfranco Hewitt CNP   simvastatin (ZOCOR) 20 MG tablet Take 1 tablet by mouth nightly. 5/25/13  Yes Meera Chandler CNP        Allergies:  Patient has no known allergies. Review of Systems:   · Constitutional: there has been no unanticipated weight loss. There's been no change in energy level, sleep pattern, or activity level. · Eyes: No visual changes or diplopia. No scleral icterus. · ENT: No Headaches, hearing loss or vertigo. No mouth sores or sore throat. · Cardiovascular: Reviewed in HPI  · Respiratory: No cough or wheezing, no sputum production. No hematemesis. · Gastrointestinal: No abdominal pain, appetite loss, blood in stools. No change in bowel or bladder habits. · Genitourinary: No dysuria, trouble voiding, or hematuria. · Musculoskeletal:  No gait disturbance, weakness or joint complaints. · Integumentary: No rash or pruritis. · Neurological: No headache, diplopia, change in muscle strength, numbness or tingling. No change in gait, balance, coordination, mood, affect, memory, mentation, behavior. · Psychiatric: No anxiety, no depression. · Endocrine: No malaise, fatigue or temperature intolerance. No excessive thirst, fluid intake, or urination. No tremor. · Hematologic/Lymphatic: No abnormal bruising or bleeding, blood clots or swollen lymph nodes. · Allergic/Immunologic: No nasal congestion or hives.     Physical Examination:    Vitals:    08/03/20 1246   BP: 134/72   Pulse: 62   SpO2: 98%        Constitutional and General Appearance: NAD   Respiratory:  · Normal excursion and expansion without use of accessory muscles  · Resp Auscultation: Soft Normal breath sounds without dullness  Cardiovascular:  · The apical impulses not displaced  · Heart tones are crisp and normal  · Cervical veins are not engorged  · The carotid upstroke is normal in amplitude and contour without delay or bruit  · Normal S1S2, No S3, Soft 2/6 LAST  · Peripheral pulses are symmetrical and full  · There is no presence. It is both accurate and complete to my knowledge. I agree with the details independently gathered by the clinical support staff, while the remaining scribed note accurately describes my personal service to the patient. Cost of prescription medications and patient compliance have been reviewed with patient. All questions answered. Nicol Foss.  Kristin Walters M.D., [facility] [place]

## 2020-08-03 NOTE — PATIENT INSTRUCTIONS
Plan:  1. Medication and stroke risk discussed in detail. Patient chooses to remain on Plavix only. 2. Continue current medication regimen. No changes  3.  Follow up in 9 months

## 2020-08-03 NOTE — PROGRESS NOTES
Carelink [facility] check in office, pt to see SMM today. Last interrogation 1/28/20. Dual icd w/ normal function. AP 99.6%.  0.1%. Thoracic impedance trend stable. Episodes Since: 08-Oct-2019  22 Non-sustained VT (coreg). 1 Monitored AT/AF on 6/21 x 3 min. Hx pAF (oac). Follow up in 3 months via carelink. See PACEART report under Cardiology tab.

## 2020-08-07 ENCOUNTER — TELEPHONE (OUTPATIENT)
Dept: CARDIOLOGY CLINIC | Age: 65
End: 2020-08-07

## 2020-08-07 RX ORDER — CARVEDILOL 25 MG/1
25 TABLET ORAL 2 TIMES DAILY
Qty: 180 TABLET | Refills: 3 | Status: SHIPPED | OUTPATIENT
Start: 2020-08-07 | End: 2021-04-28 | Stop reason: SDUPTHER

## 2020-08-07 NOTE — TELEPHONE ENCOUNTER
----- Message from Jacy Grimaldo MD sent at 8/6/2020  9:11 PM EDT -----  Rates borderline well controlled in atrial fibrillation. Please ask him to increase his coreg to 25 mg BID if blood pressure can tolerate. NSVT very short.

## 2020-08-07 NOTE — TELEPHONE ENCOUNTER
Spoke with patient. Reported BP at home runs [id number]. Informed patient [address] [employer] wants coreg increased to 25mg BID due to elevated HR. Patient verbalized understanding. Needs new RX. New RX pended.

## 2020-08-10 RX ORDER — CLOPIDOGREL BISULFATE 75 MG/1
75 TABLET ORAL DAILY
Qty: 90 TABLET | Refills: 3 | Status: SHIPPED | OUTPATIENT
Start: 2020-08-10 | End: 2021-04-28 | Stop reason: SDUPTHER

## 2020-08-10 RX ORDER — ATORVASTATIN CALCIUM 80 MG/1
80 TABLET, FILM COATED ORAL NIGHTLY
Qty: 90 TABLET | Refills: 3 | Status: SHIPPED | OUTPATIENT
Start: 2020-08-10 | End: 2021-04-28 | Stop reason: SDUPTHER

## 2020-10-12 RX ORDER — LISINOPRIL 10 MG/1
TABLET ORAL
Qty: 90 TABLET | Refills: 3 | Status: SHIPPED | OUTPATIENT
Start: 2020-10-12 | End: 2021-04-28 | Stop reason: SDUPTHER

## 2020-11-06 ENCOUNTER — TELEPHONE (OUTPATIENT)
Dept: CARDIOLOGY CLINIC | Age: 65
End: 2020-11-06

## 2020-11-06 NOTE — TELEPHONE ENCOUNTER
Attempted to call patient x2 to discuss recommendations per [facility] remote device check. No answer. Unable to leave message. Patient needs OV with either [facility] or [facility] or NP BB with device check to change settings on device. Patient has not been seen since 2018 with [facility].

## 2020-11-09 ENCOUNTER — TELEPHONE (OUTPATIENT)
Dept: CARDIOLOGY CLINIC | Age: 65
End: 2020-11-09

## 2020-11-09 NOTE — TELEPHONE ENCOUNTER
Addison Ndiaye MD at 11/4/2020  7:35 AM     Status: Signed       No changes. Lets check on him. If no symptoms then follow up in clinic as scheduled. Less therapy the better unless symptoms. Thanks. Juan White at 11/4/2020  4:05 PM     Status: Signed       We received remote transmission from patient's monitor at home. Transmission shows normal sensing and pacing function. EP physician will review. See interrogation under cardiology tab in the [address] [place] [address] [address] field for more details. Possible OptiVol fluid accumulation: 12-Oct-2020 -- 22-Oct-2020. -optivol @ baseline currently. Hx nsvt-(coreg). Episodes Since: 03-Aug-2020  10 Non-sustained VT-all < 3 sec except # 66-VT x 10 sec @  136-150 bpm and below detection rate  Of 167 bpm.       [facility] PLEASE REVIEW AND ADVISE.     Follow up in 3 months via carelink. Spoke to patient. He denies symptoms w/ these events. Patient was transferred to the  to make an appointment w/ EP per previous conversations w/ RN.

## 2020-11-09 NOTE — TELEPHONE ENCOUNTER
Spoke with wife. She stated pt was working on her car at the moment but she wrote down a message for him to call back for appt and if he has any questions about need for appt.

## 2020-11-25 ENCOUNTER — OFFICE VISIT (OUTPATIENT)
Dept: CARDIOLOGY CLINIC | Age: 65
End: 2020-11-25
Payer: MEDICARE

## 2020-11-25 ENCOUNTER — NURSE ONLY (OUTPATIENT)
Dept: CARDIOLOGY CLINIC | Age: 65
End: 2020-11-25
Payer: MEDICARE

## 2020-11-25 VITALS
HEIGHT: 68 IN | WEIGHT: 174 LBS | OXYGEN SATURATION: 96 % | DIASTOLIC BLOOD PRESSURE: 84 MMHG | SYSTOLIC BLOOD PRESSURE: 126 MMHG | HEART RATE: 71 BPM | BODY MASS INDEX: 26.37 KG/M2

## 2020-11-25 PROCEDURE — G8417 CALC BMI ABV UP PARAM F/U: HCPCS | Performed by: NURSE PRACTITIONER

## 2020-11-25 PROCEDURE — G8427 DOCREV CUR MEDS BY ELIG CLIN: HCPCS | Performed by: NURSE PRACTITIONER

## 2020-11-25 PROCEDURE — 4004F PT TOBACCO SCREEN RCVD TLK: CPT | Performed by: NURSE PRACTITIONER

## 2020-11-25 PROCEDURE — 1123F ACP DISCUSS/DSCN MKR DOCD: CPT | Performed by: NURSE PRACTITIONER

## 2020-11-25 PROCEDURE — G8484 FLU IMMUNIZE NO ADMIN: HCPCS | Performed by: NURSE PRACTITIONER

## 2020-11-25 PROCEDURE — 99214 OFFICE O/P EST MOD 30 MIN: CPT | Performed by: NURSE PRACTITIONER

## 2020-11-25 PROCEDURE — 93283 PRGRMG EVAL IMPLANTABLE DFB: CPT | Performed by: INTERNAL MEDICINE

## 2020-11-25 PROCEDURE — 3017F COLORECTAL CA SCREEN DOC REV: CPT | Performed by: NURSE PRACTITIONER

## 2020-11-25 PROCEDURE — 4040F PNEUMOC VAC/ADMIN/RCVD: CPT | Performed by: NURSE PRACTITIONER

## 2020-11-25 NOTE — LETTER
[facility], [place]   Electrophysiology Outpatient Note              Date:  November 25, 2020  Patient name: Alan Alarcon  YOB: 1955    Primary Care physician: Ronie Babinski, MD    HISTORY OF PRESENT ILLNESS: The patient is a 77 y.o.  male with a history of CAD, ischemic cardiomyopathy, PAF, sinus node dysfunction, HTN, chronic combined CHF, aortic valve disease, tobacco abuse, and COPD. He had a dual chamber pacemaker implanted in 2013. He had a VT, a LHC, then CABG in 10/2015. EF was 35%. Stress test 2/2017 showed fixed defects. He had a repeat echo in 5/2017 and EF was 32%. In 7/2017 he had an upgrade to a dual chamber ICD. PAF has been detected and he has declined anticoagulation. Today he is being seen for NSVT and ischemic cardiomyopathy. Device check shows normal function, 100% AP, less than 1% , and no arrhythmias. Patient complains of chronic intermittent chest 'tingling' and chronic SOB. Denies palpitations and dizziness. Past Medical History:   has a past medical history of CAD (coronary artery disease), COPD (chronic obstructive pulmonary disease) ([facility] [address] Hypertension, and Systolic CHF, chronic ([facility] [address] Past Surgical History:   has a past surgical history that includes other surgical history (1999); Coronary angioplasty with stent (05/21/2013); pacemaker placement; Coronary artery bypass graft (10/14/2015); and hernia repair (08/2019). Home Medications:    Prior to Admission medications    Medication Sig Start Date End Date Taking?  Authorizing Provider   lisinopril (PRINIVIL;ZESTRIL) 10 MG tablet TAKE 1 TABLET BY MOUTH  DAILY 10/12/20  Yes Talat Adler MD   atorvastatin (LIPITOR) 80 MG tablet TAKE 1 TABLET BY MOUTH  NIGHTLY 8/10/20  Yes Talat Adler MD   clopidogrel (PLAVIX) 75 MG tablet TAKE 1 TABLET BY MOUTH  DAILY 8/10/20  Yes Talat Adler MD   carvedilol (COREG) 25 MG tablet Take 1 tablet by mouth 2 times daily TAKE 1 TABLET BY MOUTH TWO  TIMES DAILY 8/7/20  Yes Elana Jenkins MD   aspirin 81 MG chewable tablet Take 1 tablet by mouth daily. 5/25/13  Yes JOHN PAUL Renteria CNP       Allergies:  Patient has no known allergies. Social History:   reports that he has been smoking cigarettes. He has a 20.00 pack-year smoking history. He has never used smokeless tobacco. He reports that he does not drink alcohol or use drugs. Family History: family history includes Diabetes in his mother; High Blood Pressure in his father, mother, and sister; Lott Morales / [place] in his mother. Review of Systems   All 14-point review of systems are completed and pertinent positives are mentioned in the history of present illness. Other systems are reviewed and are negative. PHYSICAL EXAM:    Vital signs:    /84   Pulse 71   Ht 5' 8\" (1.727 m)   Wt 174 lb (78.9 kg)   SpO2 96%   BMI 26.46 kg/m²      Constitutional and general appearance: alert, cooperative, no distress and appears stated age  HEENT: PERRL, no cervical lymphadenopathy. No masses palpable. Normal oral mucosa  Respiratory:  · Normal excursion and expansion without use of accessory muscles  · Resp auscultation: Clear but diminished breath sounds without wheezing, rhonchi, and rales  Cardiovascular:  · The apical impulse is not displaced  · Gr 0-2/4 systolic murmur. regular S1 and S2.  · Jugular venous pulsation Normal  · The carotid upstroke is normal in amplitude and contour without delay or bruit  · Peripheral pulses are symmetrical and full   Abdomen:  · No masses or tenderness  · Bowel sounds present  Extremities:  ·  No cyanosis or clubbing  ·  No lower extremity edema  ·  Skin: warm and dry  Neurological:  · Alert and oriented  · Moves all extremities well  · No abnormalities of mood, affect, memory, mentation, or behavior are noted    DATA:      Limited echo 5/25/2017:  Limited echo for LV function with limited doppler/no color. The left ventricular systolic function is moderately reduced with an ejection fraction of 32 % by Turpin's method. Mild concentric left ventricular hypertrophy. [place], apical segments, mid anteroseptum & mid inferoseptum are akinetic. Grade I diastolic dysfunction with normal filing pressure. Last study on 2/9/2017 showed EF 30-35%. Echo 2/9/2017:  -- Normal left ventricle size. Mild concentric left ventricular hypertrophy. The left ventricular systolic function is moderately reduced with an ejection fraction of 30-35 %. The apex and apical segments are akinetic. Akinesis of the anteroseptal wall. No obvious LV thrombus seen. Grade II diastolic dysfunction with elevated filing pressure. -- Mild mitral regurgitation. -- Mild aortic stenosis. Moderate aortic regurgitation. -- The right ventricle is normal in size with decreased function. TAPSE is measured at 13mm. Stress test 2/9/2017:  ABNORMAL HIGH RISK SCORE     The LV is dilated with reduced LVEF of 34%. Severe global wall motion with     akinesis of the apical sepal wall. There is a large sized severe defect in     the mid/apical anterior, septal, and entire inferior wall at stress and rest     consistent with scar. No significant ischemia was seen. All labs and testing reviewed. CARDIOLOGY LABS:   CBC: No results for input(s): WBC, HGB, HCT, PLT in the last 72 hours. BMP: No results for input(s): NA, K, CO2, BUN, CREATININE, LABGLOM, GLUCOSE in the last 72 hours. PT/INR: No results for input(s): PROTIME, INR in the last 72 hours. APTT:No results for input(s): APTT in the last 72 hours. FASTING LIPID PANEL:  Lab Results   Component Value Date    HDL 28 07/28/2020    LDLCALC 48 07/28/2020    TRIG 167 07/28/2020     LIVER PROFILE:No results for input(s): AST, ALT, ALB in the last 72 hours. Assessment:   1.  Ischemic cardiomyopathy: ongoing   -s/p upgrade to dual chamber ICD 7/2017

## 2020-11-25 NOTE — PROGRESS NOTES
Patient presents to the device clinic today for a programming evaluation for his defibrillator. Patient has a history of VT, CM, SND, and pAF. Takes Plavix and Coreg. Last device interrogation was on 11/4. Since then, no arrhythmias recorded. Previous fluid accumulation in early October. AP 99.8%  0.1%  All sensing and pacing parameters are within normal range. Increased A output to 2V to keep 2:1. VT monitor interval was changed to 140 bpm (430 ms) per Dr. Caron Venegas previous note. Patient education was provided about device functionality, in home monitoring, and any other patient questions and/or concerns were addressed. Patient voices understanding. Please see interrogation for more detail. Patient will see LUI Apple today in office. Patient will follow up in 3 months in office or remotely. See [employer] report under the Cardiology tab.

## 2020-11-25 NOTE — PROGRESS NOTES
[address]   Electrophysiology Outpatient Note              Date:  November 25, 2020  Patient name: Lyric Hope  YOB: 1955    Primary Care physician: Eliot Arora MD    HISTORY OF PRESENT ILLNESS: The patient is a 77 y.o.  male with a history of CAD, ischemic cardiomyopathy, PAF, sinus node dysfunction, HTN, chronic combined CHF, aortic valve disease, tobacco abuse, and COPD. He had a dual chamber pacemaker implanted in 2013. He had a VT, a LHC, then CABG in 10/2015. EF was 35%. Stress test 2/2017 showed fixed defects. He had a repeat echo in 5/2017 and EF was 32%. In 7/2017 he had an upgrade to a dual chamber ICD. PAF has been detected and he has declined anticoagulation. Today he is being seen for NSVT and ischemic cardiomyopathy. Device check shows normal function, 100% AP, less than 1% , and no arrhythmias. Patient complains of chronic intermittent chest 'tingling' and chronic SOB. Denies palpitations and dizziness. Past Medical History:   has a past medical history of CAD (coronary artery disease), COPD (chronic obstructive pulmonary disease) ([facility] [address] Hypertension, and Systolic CHF, chronic ([facility] [address] Past Surgical History:   has a past surgical history that includes other surgical history (1999); Coronary angioplasty with stent (05/21/2013); pacemaker placement; Coronary artery bypass graft (10/14/2015); and hernia repair (08/2019). Home Medications:    Prior to Admission medications    Medication Sig Start Date End Date Taking?  Authorizing Provider   lisinopril (PRINIVIL;ZESTRIL) 10 MG tablet TAKE 1 TABLET BY MOUTH  DAILY 10/12/20  Yes Connor Rosales MD   atorvastatin (LIPITOR) 80 MG tablet TAKE 1 TABLET BY MOUTH  NIGHTLY 8/10/20  Yes Connor Rosales MD   clopidogrel (PLAVIX) 75 MG tablet TAKE 1 TABLET BY MOUTH  DAILY 8/10/20  Yes Connor Rosales MD   carvedilol (COREG) 25 MG tablet Take 1 tablet by mouth 2 times daily TAKE 1 TABLET BY MOUTH TWO TIMES DAILY 8/7/20  Yes Addison Ndiaye MD   aspirin 81 MG chewable tablet Take 1 tablet by mouth daily. 5/25/13  Yes JOHN PAUL Patino CNP       Allergies:  Patient has no known allergies. Social History:   reports that he has been smoking cigarettes. He has a 20.00 pack-year smoking history. He has never used smokeless tobacco. He reports that he does not drink alcohol or use drugs. Family History: family history includes Diabetes in his mother; High Blood Pressure in his father, mother, and sister; Radha Zamora / [place] in his mother. Review of Systems   All 14-point review of systems are completed and pertinent positives are mentioned in the history of present illness. Other systems are reviewed and are negative. PHYSICAL EXAM:    Vital signs:    /84   Pulse 71   Ht 5' 8\" (1.727 m)   Wt 174 lb (78.9 kg)   SpO2 96%   BMI 26.46 kg/m²      Constitutional and general appearance: alert, cooperative, no distress and appears stated age  HEENT: PERRL, no cervical lymphadenopathy. No masses palpable. Normal oral mucosa  Respiratory:  · Normal excursion and expansion without use of accessory muscles  · Resp auscultation: Clear but diminished breath sounds without wheezing, rhonchi, and rales  Cardiovascular:  · The apical impulse is not displaced  · Gr 4-6/7 systolic murmur. regular S1 and S2.  · Jugular venous pulsation Normal  · The carotid upstroke is normal in amplitude and contour without delay or bruit  · Peripheral pulses are symmetrical and full   Abdomen:  · No masses or tenderness  · Bowel sounds present  Extremities:  ·  No cyanosis or clubbing  ·  No lower extremity edema  ·  Skin: warm and dry  Neurological:  · Alert and oriented  · Moves all extremities well  · No abnormalities of mood, affect, memory, mentation, or behavior are noted    DATA:      Limited echo 5/25/2017:  Limited echo for LV function with limited doppler/no color.   The left ventricular systolic function is moderately reduced with an ejection fraction of 32 % by Turpin's method. Mild concentric left ventricular hypertrophy. [place], apical segments, mid anteroseptum & mid inferoseptum are akinetic. Grade I diastolic dysfunction with normal filing pressure. Last study on 2/9/2017 showed EF 30-35%. Echo 2/9/2017:  -- Normal left ventricle size. Mild concentric left ventricular hypertrophy. The left ventricular systolic function is moderately reduced with an ejection fraction of 30-35 %. The apex and apical segments are akinetic. Akinesis of the anteroseptal wall. No obvious LV thrombus seen. Grade II diastolic dysfunction with elevated filing pressure. -- Mild mitral regurgitation. -- Mild aortic stenosis. Moderate aortic regurgitation. -- The right ventricle is normal in size with decreased function. TAPSE is measured at 13mm. Stress test 2/9/2017:  ABNORMAL HIGH RISK SCORE     The LV is dilated with reduced LVEF of 34%. Severe global wall motion with     akinesis of the apical sepal wall. There is a large sized severe defect in     the mid/apical anterior, septal, and entire inferior wall at stress and rest     consistent with scar. No significant ischemia was seen. All labs and testing reviewed. CARDIOLOGY LABS:   CBC: No results for input(s): WBC, HGB, HCT, PLT in the last 72 hours. BMP: No results for input(s): NA, K, CO2, BUN, CREATININE, LABGLOM, GLUCOSE in the last 72 hours. PT/INR: No results for input(s): PROTIME, INR in the last 72 hours. APTT:No results for input(s): APTT in the last 72 hours. FASTING LIPID PANEL:  Lab Results   Component Value Date    HDL 28 07/28/2020    LDLCALC 48 07/28/2020    TRIG 167 07/28/2020     LIVER PROFILE:No results for input(s): AST, ALT, ALB in the last 72 hours. Assessment:   1.  Ischemic cardiomyopathy: ongoing   -s/p upgrade to dual chamber ICD 7/2017   -device check today shows normal function, 100% AP, less than 1% , and no arrhythmias  2. Sinus node dysfunction: stable   -s/p dual chamber pacemaker implant 2013 with upgrade as noted above  3. Paroxysmal atrial fibrillation: stable   -noted on device checks    -[id number] score 4 (age, HTN, vascular disease, CHF)  4. NSVT and VT: noted on device checks     -VT noted prior to CABG   -NSVT noted on device checks (Coreg increased 8/2020)  5. HTN: controlled    6. CAD   -s/p PCI then CABG 10/2015   -stress test 2/2017 with fixed defects   -followed by Dr. Wei Fontana  7. Chronic combined CHF: compensated  8. Aortic valve disease   -mild AS and moderate AI on echo 2/2017  9. COPD  10. Tobacco abuse: ongoing     Plan:   1. Continue Coreg and lisinopril   2. DAPT per cardiology   3. Patient declines anticoagulation for atrial fibrillation and verbalizes an understanding of stroke risk  4. Smoking cessation is recommended   5. Cardiology following for CHF, CAD, and aortic valve disease   6. Follow up in 3 months with Dr. May Kim to reestablish care with MD    Extensive discussion regarding current medications took place during visit today. Dr. Gregory Serve note in 8/2020 indicated Coreg was increased to 25mg po BID however the patient reports he has been on that dose for over a year. Notes are not consistent with his report. It appears Coreg was increased in 8/2020. NSVT is stable with -150 and VT detection today was lowered to 140 BPM. No changes today but will have to consider antiarrhythmic if NSVT worsens.      Miladis Kincaid, APRN-CNP  [facility]  [phone]

## 2021-02-25 ENCOUNTER — NURSE ONLY (OUTPATIENT)
Dept: CARDIOLOGY CLINIC | Age: 66
End: 2021-02-25
Payer: MEDICARE

## 2021-02-25 ENCOUNTER — OFFICE VISIT (OUTPATIENT)
Dept: CARDIOLOGY CLINIC | Age: 66
End: 2021-02-25
Payer: MEDICARE

## 2021-02-25 VITALS
HEIGHT: 68 IN | SYSTOLIC BLOOD PRESSURE: 130 MMHG | BODY MASS INDEX: 26.52 KG/M2 | WEIGHT: 175 LBS | OXYGEN SATURATION: 97 % | HEART RATE: 76 BPM | DIASTOLIC BLOOD PRESSURE: 80 MMHG

## 2021-02-25 DIAGNOSIS — I50.22 SYSTOLIC CHF, CHRONIC (HCC): ICD-10-CM

## 2021-02-25 DIAGNOSIS — I21.4 NSTEMI (NON-ST ELEVATED MYOCARDIAL INFARCTION) (HCC): ICD-10-CM

## 2021-02-25 DIAGNOSIS — Z95.810 DUAL ICD (IMPLANTABLE CARDIOVERTER-DEFIBRILLATOR) IN PLACE: ICD-10-CM

## 2021-02-25 DIAGNOSIS — I49.5 SINUS NODE DYSFUNCTION (HCC): ICD-10-CM

## 2021-02-25 DIAGNOSIS — Z95.0 CARDIAC PACEMAKER IN SITU: ICD-10-CM

## 2021-02-25 DIAGNOSIS — I47.20 VENTRICULAR TACHYCARDIA: ICD-10-CM

## 2021-02-25 DIAGNOSIS — I47.20 VENTRICULAR TACHYCARDIA: Primary | ICD-10-CM

## 2021-02-25 DIAGNOSIS — I25.5 ISCHEMIC CARDIOMYOPATHY: ICD-10-CM

## 2021-02-25 DIAGNOSIS — I25.110 CORONARY ARTERY DISEASE INVOLVING NATIVE CORONARY ARTERY OF NATIVE HEART WITH UNSTABLE ANGINA PECTORIS (HCC): ICD-10-CM

## 2021-02-25 DIAGNOSIS — Z95.1 S/P CABG X 2: ICD-10-CM

## 2021-02-25 DIAGNOSIS — I48.0 PAF (PAROXYSMAL ATRIAL FIBRILLATION) (HCC): ICD-10-CM

## 2021-02-25 DIAGNOSIS — Z95.810 ICD (IMPLANTABLE CARDIOVERTER-DEFIBRILLATOR) IN PLACE: ICD-10-CM

## 2021-02-25 PROCEDURE — G8484 FLU IMMUNIZE NO ADMIN: HCPCS | Performed by: INTERNAL MEDICINE

## 2021-02-25 PROCEDURE — 93283 PRGRMG EVAL IMPLANTABLE DFB: CPT | Performed by: INTERNAL MEDICINE

## 2021-02-25 PROCEDURE — 93000 ELECTROCARDIOGRAM COMPLETE: CPT | Performed by: INTERNAL MEDICINE

## 2021-02-25 PROCEDURE — 4040F PNEUMOC VAC/ADMIN/RCVD: CPT | Performed by: INTERNAL MEDICINE

## 2021-02-25 PROCEDURE — 3017F COLORECTAL CA SCREEN DOC REV: CPT | Performed by: INTERNAL MEDICINE

## 2021-02-25 PROCEDURE — G8427 DOCREV CUR MEDS BY ELIG CLIN: HCPCS | Performed by: INTERNAL MEDICINE

## 2021-02-25 PROCEDURE — 99214 OFFICE O/P EST MOD 30 MIN: CPT | Performed by: INTERNAL MEDICINE

## 2021-02-25 PROCEDURE — 4004F PT TOBACCO SCREEN RCVD TLK: CPT | Performed by: INTERNAL MEDICINE

## 2021-02-25 PROCEDURE — 93290 INTERROG DEV EVAL ICPMS IP: CPT | Performed by: INTERNAL MEDICINE

## 2021-02-25 PROCEDURE — G8417 CALC BMI ABV UP PARAM F/U: HCPCS | Performed by: INTERNAL MEDICINE

## 2021-02-25 PROCEDURE — 1123F ACP DISCUSS/DSCN MKR DOCD: CPT | Performed by: INTERNAL MEDICINE

## 2021-02-25 NOTE — PATIENT INSTRUCTIONS
RECOMMENDATIONS:  1. Continue current medications as prescribed. 2. Remain as active as possible. Encourage smoking cessation. 3. Continue with regular device checks. 4. Follow up with me in 1 year.

## 2021-02-25 NOTE — LETTER
See above. [address]   Electrophysiology Consult Note              Date:  February 25, 2021  Patient name: Leonela Brito  YOB: 1955    Primary Care Physician: Mariluz Goff MD    CHIEF COMPLAINT:   Chief Complaint   Patient presents with    3 Month Follow-Up    Atrial Fibrillation    Congestive Heart Failure    Cardiomyopathy       HISTORY OF PRESENT ILLNESS: Leonela Brito is a 74 y.o. male with a history of CAD, ischemic cardiomyopathy, PAF, sinus node dysfunction, HTN, chronic combined CHF, aortic valve disease, tobacco abuse, and COPD. He had a dual chamber pacemaker implanted in 2013. He had a VT, a LHC, then CABG in 10/2015. EF was 35%. Stress test 2/2017 showed fixed defects. He had a repeat echo in 5/2017 and EF was 32%. In 7/2017 he had an upgrade to a dual chamber ICD. PAF has been detected and he has declined anticoagulation. On 11/25/2020, patient complains of chronic intermittent chest 'tingling' and chronic SOB. Denies palpitations and dizziness. Today, 2/25/2021, patient's device interrogation demonstrates %,  <1% 8 NSVT episodes 3-4 beats 1-2 seconds duration, last 2/14/2021. His EKG today, 2/25/2021, shows a- paced rate 65. He reports that he has been feeling well from a cardiac standpoint. He does admit to chronic shortness of breath that he feels is stable. This does not limit him. He continues to work every day and tolerates this well. [facility] reports that he has been taking his medications as prescribed without any known side effects. He continues to smoke but is cutting back. Patient currently denies any weight gain, edema, palpitations, chest pain, dizziness, and syncope. Past Medical History:   has a past medical history of CAD (coronary artery disease), COPD (chronic obstructive pulmonary disease) ([facility] [address] Hypertension, and Systolic CHF, chronic ([facility] [address] Past Surgical History:   has a past surgical history that includes other surgical history (1999); Coronary angioplasty with stent (05/21/2013); pacemaker placement; Coronary artery bypass graft (10/14/2015); and hernia repair (08/2019). Allergies:  Patient has no known allergies. Social History:   reports that he has been smoking cigarettes. He has a 20.00 pack-year smoking history. He has never used smokeless tobacco. He reports that he does not drink alcohol or use drugs. Family History: family history includes Diabetes in his mother; High Blood Pressure in his father, mother, and sister; August Álvarez / [place] in his mother. Home Medications:    Prior to Admission medications    Medication Sig Start Date End Date Taking? Authorizing Provider   lisinopril (PRINIVIL;ZESTRIL) 10 MG tablet TAKE 1 TABLET BY MOUTH  DAILY 10/12/20  Yes Didi Lyman MD   atorvastatin (LIPITOR) 80 MG tablet TAKE 1 TABLET BY MOUTH  NIGHTLY 8/10/20  Yes Didi Lyman MD   clopidogrel (PLAVIX) 75 MG tablet TAKE 1 TABLET BY MOUTH  DAILY 8/10/20  Yes Didi Lyman MD   carvedilol (COREG) 25 MG tablet Take 1 tablet by mouth 2 times daily TAKE 1 TABLET BY MOUTH TWO  TIMES DAILY 8/7/20  Yes Beryle Evan., MD   aspirin 81 MG chewable tablet Take 1 tablet by mouth daily. 5/25/13  Yes JOHN PAUL Fam - CNP       REVIEW OF SYSTEMS:    All 14-point review of systems are completed and  pertinent positives are mentioned in the history of present illness. Other  systems are reviewed and are negative. Physical Examination:    /80   Pulse 76   Ht 5' 8\" (1.727 m)   Wt 175 lb (79.4 kg)   SpO2 97%   BMI 26.61 kg/m²      Constitutional and General Appearance:    alert, cooperative, no distress and appears stated age  [de-identified]:    PERRLA, no cervical lymphadenopathy. No masses palpable.  Normal oral  mucosa  Respiratory:  · Normal excursion and expansion without use of accessory muscles · Resp Auscultation: Normal breath sounds without dullness or wheezing  Cardiovascular:  · The apical impulse is not displaced  · Heart tones are crisp and normal. regular S1 and S2.  · Grade II/VI systolic murmur best LLSB  Abdomen:  · No masses or tenderness  · Bowel sounds present  Extremities:  ·  No Cyanosis or Clubbing  ·  Lower extremity edema: No  · Skin: Warm and dry  Neurological:  · Alert and oriented. · Moves all extremities well  · No abnormalities of mood, affect, memory, mentation, or behavior are noted    DATA:    ECG 2/25/2021:  A-paced 65 BPM.     IMPRESSION:    1. Ventricular tachycardia ([address]    2. Coronary artery disease involving native coronary artery of native heart with unstable angina pectoris ([address]    3. Systolic CHF, chronic ([address]    4. Ischemic cardiomyopathy    5. NSTEMI (non-ST elevated myocardial infarction) ([address]    6. PAF (paroxysmal atrial fibrillation) ([address]    7. S/P CABG x 2    8. Cardiac pacemaker in situ    9. Dual ICD (implantable cardioverter-defibrillator) in place      1. Non-sustained ventricular tachycardia/ventricular tachycardia. Patient is a pleasant 77-year-old male with a medical regimen for ventricular tachycardia prior to CABG, ischemic cardiomyopathy status post CABG, paroxysmal atrial fibrillation not on anticoagulation per patient's preference, hypertension, hyperlipidemia, sick sinus syndrome status post dual-chamber ICD who presents from home to [facility] care. She has had no shocks from his device. His continues have some nonsustained ventricular tachycardia on his device but nothing lasting more we discussed potential options including antiarrhythmic therapy and ablation. At this point I see no indication either of these options at this point. We will continue remote monitoring for now. Follow-up with EP NP in 1 year. - Continue GDMT with carvedilol.  - Continue remote monitoring.     2. Sick sinus syndrome status post DC ICD ([employer]). Marleni Collins MD personally performed the services described in this documentation as scribed by nurse in my presence, and is both accurate and complete. Electronically signed by Merly Bianchi MD on 2/25/2021 at 1:23 PM       NOTE: This report was transcribed using voice recognition software. Every effort was made to ensure accuracy, however, inadvertent computerized transcription errors may be present.

## 2021-04-27 NOTE — PROGRESS NOTES
[facility], [place], operated by [facility]   Cardiac Followup    Referring Provider:  Inocencia Mariano MD     Chief Complaint   Patient presents with    Follow-up    Coronary Artery Disease    Hypertension      Subjective: Patient being seen today for cardiology follow up of CAD, CMP, HTN, smoker, s/p ICD; no complaints today    Past Medical History:   Mr. Blaise Bamberger is a 63yo male who has hx CAD s/p NSTEMI and BMS for ISR of LAD on 03/12/2015, s/p 2V CABG 10/15, ischemic CM (EF=30-35%), and s/p dual chamber/ICD upgrade 7/17. In May 2013 S/P anterior STEMI with subtotal occlusion with thrombus in LAD at 1st septal  branch. Successful OANH placed to LAD; LVEF=50%. Post-procedure he developed symptomatic junctional rhythm and sinus bradycardia requiring dual chamber pacemaker insertion on 5/24/13. Diagnosed with NSTEMI in March 2015 with peak TnI=67. He underwent cardiac that showed EF 25-30%. He had PCI S/P cutting balloon PTCA and BMS placement to LAD for ISR. Note he underwent PM device check October 2015 that showed NSVT and c/o chest pain. Subsequently, most recent LHC10/9/15 that showed recurrent ISR of LAD and CABG deemed necessary. He is s/p 2V CABG with LIMA-LAD and SVG-distal RCA on 10/14/15. Carotid doppler 10/9/15 less than 50% bilaterally. Most recent lexiscan myoview stress test 2/9/17 LVEF of 34%. Severe global wall motion with AK of the apical sepal wall; large sized severe defect in  the mid/apical anterior, septal, and entire inferior wall at stress and rest c/w scar. No ishecmia. Most recent ECHO 2/9/17 mild concentric LVH, EF of 30-35%; AK LAD territory;  Grade II DD with elevated filling pressure; mild MR; Mild AS; Moderate AI. Due to continued low EF despite CHF med titration I referred him to Dr Stevan Whitehead and he upgraded him on 7/12/17 to dual chamber PM/ICD. Most recent device check Feb 2021 with 8 NSVT which is baseline. He walks 10 miles daily. He smokes 10 cigarettes daily.   He never started on Eliquis due to the cost and is aware of the risk of stroke and REFUSES to take NOAC or coumadin. He only wants to continue DAPT. History of present illness:    Most recent EKG 2/2021 atrial paced; Anterior infarct. T wave inversions in lateral leads (no change 3/19). Today he reports that he has been feeling fairly well. He does admit to tingling and aching in his chest that occurs rarely. This does not last long and resolves on its own. He also admits to exertional SOB. This is chronic for him. He continues to walk 10 miles daily without any issues. He continues to smoke but is attempting to cut back. He continues to refuse stronger anticoagulation, understanding the risks. He has followed up with Dr. Balbir Hernandez and was thought to be stable. He received both Moderna COVID vaccines. Patient currently denies any weight gain, edema, palpitations, dizziness, and syncope. Past Medical History:     has a past medical history of CAD (coronary artery disease), COPD (chronic obstructive pulmonary disease) ([facility] [address] Hypertension, and Systolic CHF, chronic ([facility] [address] Surgical History:   has a past surgical history that includes other surgical history (1999); Coronary angioplasty with stent (05/21/2013); pacemaker placement; Coronary artery bypass graft (10/14/2015); and hernia repair (08/2019). Social History:   He is  and lives at [facility] with his wife. He is retired. He was a self employed . Smoker. He reports that he drinks about 2-3 beers a few days a week. He reports that he does not use illicit drugs. Family History:  family history includes Diabetes in his mother; High Blood Pressure in his father, mother, and sister; Gunnar Oms / [place] in his mother. Home Medications:  Prior to Admission medications    Medication Sig Start Date End Date Taking? Authorizing Provider   aspirin 81 MG chewable tablet Take 1 tablet by mouth daily.  5/25/13  Yes Jose Oliveira CNP dullness  Cardiovascular:  · The apical impulses not displaced  · Heart tones are crisp and normal  · Cervical veins are not engorged  · The carotid upstroke is normal in amplitude and contour without delay or bruit  · Normal S1S2, No S3, Soft 2/6 LAST  · Peripheral pulses are symmetrical and full  · There is no clubbing, cyanosis of the extremities.   · No edema  · Femoral Arteries: 2+ and equal  · Pedal Pulses: 2+ and equal   Abdomen:  · No masses or tenderness  · Liver/Spleen: No Abnormalities Noted  Neurological/Psychiatric:  · Alert and oriented in all spheres  · Moves all extremities well  · Exhibits normal gait balance and coordination  · No abnormalities of mood, affect, memory, mentation, or behavior are noted  ·   Lab Results   Component Value Date    CHOL 109 07/28/2020    CHOL 140 12/26/2018    CHOL 122 11/07/2017     Lab Results   Component Value Date    TRIG 167 (H) 07/28/2020    TRIG 194 (H) 12/26/2018    TRIG 134 11/07/2017     Lab Results   Component Value Date    HDL 28 (L) 07/28/2020    HDL 30 (L) 12/26/2018    HDL 27 (L) 11/07/2017     Lab Results   Component Value Date    LDLCALC 48 07/28/2020    LDLCALC 71 12/26/2018    LDLCALC 68 11/07/2017     Lab Results   Component Value Date    LABVLDL 33 07/28/2020    LABVLDL 39 12/26/2018    LABVLDL 27 11/07/2017     No results found for: [facility]  Lab Results   Component Value Date    CREATININE 0.8 07/28/2020    BUN 18 07/28/2020     07/28/2020    K 3.9 07/28/2020     07/28/2020    CO2 26 07/28/2020     Lab Results   Component Value Date    ALT 14 07/28/2020    AST 17 07/28/2020    ALKPHOS 53 07/28/2020    BILITOT 0.4 07/28/2020       Lab Results   Component Value Date    CHOL 109 07/28/2020    CHOL 140 12/26/2018    CHOL 122 11/07/2017     Lab Results   Component Value Date    TRIG 167 (H) 07/28/2020    TRIG 194 (H) 12/26/2018    TRIG 134 11/07/2017     Lab Results   Component Value Date    HDL 28 (L) 07/28/2020    HDL 30 (L) 12/26/2018 HDL 27 (L) 11/07/2017     Lab Results   Component Value Date    LDLCALC 48 07/28/2020    LDLCALC 71 12/26/2018    LDLCALC 68 11/07/2017     Lab Results   Component Value Date    LABVLDL 33 07/28/2020    LABVLDL 39 12/26/2018    LABVLDL 27 11/07/2017     Assessment:     1. NSTEMI:  Resolved. Stable and will continue present medical regimen. Stable and will continue present medical regimen. There are no concerning symptoms for angina currently. I have strongly encouraged him to quit smoking entirely in past but he still has no interest.          2. CAD (coronary artery disease) Stable and will continue present medical regimen. He is s/p 2V CABG with LIMA-LAD and SVG-distal RCA on 10/14/15. Most recent stress test 2/9/17 LVEF of 34%; +scar without with no significant ischemia. 3. Cardiac pacemaker in situ:  Stable. Junctional rhythm and sinus bradycardia requiring dual chamber pacemaker insertion on 5/24/13. See # 2 above. I was concerned with large area of scar on stress testing, ischemic cardiomyopathy, and EF=30-35%. Therefore, he was upgraded him 7/12/17 to dual chamber ICD. Most recent device check Feb 2021 with 8 NSVT which is baseline. 4.  I personally reviewed most recent lipids 7/28/20. Results above. Overall well controlled at goal except lower HDL which will treat due to no proven clinical benefit. 5.  Ischemic cardiomyopathy:  Most recent ECHO 2/9/17 mild concentric LVH, EF of 30-35%. Clinically compensated NYHA Class II and will continue current CHF medical regimen. Clinically compensated NYHA Class II and will continue current CHF medical regimen. Plan:  1. Discussed the importance of smoking cessation. 2. Again reviewed stronger blood thinner. 3. Continue current medications as prescribed. 4. CMP, CBC, Lipids  5. Follow up with me in 9 months.      This note was scribed in the presence of Dr. Nichol Russell MD by Michael Gama RN.       I, Dr. Nichol Russell, personally performed the services described in this documentation, as scribed by the above signed scribe in my presence. It is both accurate and complete to my knowledge. I agree with the details independently gathered by the clinical support staff, while the remaining scribed note accurately describes my personal service to the patient. Cost of prescription medications and patient compliance have been reviewed with patient. All questions answered. Shahid Mijares.  Simon Pearson M.D., [facility]

## 2021-04-28 ENCOUNTER — OFFICE VISIT (OUTPATIENT)
Dept: CARDIOLOGY CLINIC | Age: 66
End: 2021-04-28
Payer: COMMERCIAL

## 2021-04-28 VITALS
WEIGHT: 174.5 LBS | SYSTOLIC BLOOD PRESSURE: 138 MMHG | DIASTOLIC BLOOD PRESSURE: 80 MMHG | BODY MASS INDEX: 26.45 KG/M2 | OXYGEN SATURATION: 98 % | HEART RATE: 66 BPM | HEIGHT: 68 IN

## 2021-04-28 DIAGNOSIS — I48.0 PAF (PAROXYSMAL ATRIAL FIBRILLATION) (HCC): ICD-10-CM

## 2021-04-28 DIAGNOSIS — Z95.1 S/P CABG X 2: ICD-10-CM

## 2021-04-28 DIAGNOSIS — Z72.0 CONTINUOUS TOBACCO ABUSE: ICD-10-CM

## 2021-04-28 DIAGNOSIS — I10 ESSENTIAL HYPERTENSION: ICD-10-CM

## 2021-04-28 DIAGNOSIS — I25.5 ISCHEMIC CARDIOMYOPATHY: ICD-10-CM

## 2021-04-28 DIAGNOSIS — I25.10 CORONARY ARTERY DISEASE INVOLVING NATIVE CORONARY ARTERY OF NATIVE HEART WITHOUT ANGINA PECTORIS: Primary | ICD-10-CM

## 2021-04-28 PROCEDURE — 3017F COLORECTAL CA SCREEN DOC REV: CPT | Performed by: INTERNAL MEDICINE

## 2021-04-28 PROCEDURE — 1123F ACP DISCUSS/DSCN MKR DOCD: CPT | Performed by: INTERNAL MEDICINE

## 2021-04-28 PROCEDURE — 4004F PT TOBACCO SCREEN RCVD TLK: CPT | Performed by: INTERNAL MEDICINE

## 2021-04-28 PROCEDURE — G8427 DOCREV CUR MEDS BY ELIG CLIN: HCPCS | Performed by: INTERNAL MEDICINE

## 2021-04-28 PROCEDURE — 4040F PNEUMOC VAC/ADMIN/RCVD: CPT | Performed by: INTERNAL MEDICINE

## 2021-04-28 PROCEDURE — G8417 CALC BMI ABV UP PARAM F/U: HCPCS | Performed by: INTERNAL MEDICINE

## 2021-04-28 PROCEDURE — 99214 OFFICE O/P EST MOD 30 MIN: CPT | Performed by: INTERNAL MEDICINE

## 2021-04-28 RX ORDER — LISINOPRIL 10 MG/1
TABLET ORAL
Qty: 90 TABLET | Refills: 3 | Status: SHIPPED | OUTPATIENT
Start: 2021-04-28 | End: 2022-02-01 | Stop reason: SDUPTHER

## 2021-04-28 RX ORDER — CLOPIDOGREL BISULFATE 75 MG/1
75 TABLET ORAL DAILY
Qty: 90 TABLET | Refills: 3 | Status: SHIPPED | OUTPATIENT
Start: 2021-04-28 | End: 2022-02-01 | Stop reason: SDUPTHER

## 2021-04-28 RX ORDER — CARVEDILOL 25 MG/1
25 TABLET ORAL 2 TIMES DAILY
Qty: 180 TABLET | Refills: 3 | Status: SHIPPED | OUTPATIENT
Start: 2021-04-28 | End: 2022-02-01 | Stop reason: SDUPTHER

## 2021-04-28 RX ORDER — ATORVASTATIN CALCIUM 80 MG/1
80 TABLET, FILM COATED ORAL NIGHTLY
Qty: 90 TABLET | Refills: 3 | Status: SHIPPED | OUTPATIENT
Start: 2021-04-28 | End: 2022-02-01 | Stop reason: SDUPTHER

## 2021-04-28 NOTE — LETTER
[address]   Cardiac Followup    Referring Provider:  Claire Mills MD     Chief Complaint   Patient presents with    Follow-up    Coronary Artery Disease    Hypertension      Subjective: Patient being seen today for cardiology follow up of CAD, CMP, HTN, smoker, s/p ICD; no complaints today    Past Medical History:   Mr. Ney Lua is a 63yo male who has hx CAD s/p NSTEMI and BMS for ISR of LAD on 03/12/2015, s/p 2V CABG 10/15, ischemic CM (EF=30-35%), and s/p dual chamber/ICD upgrade 7/17. In May 2013 S/P anterior STEMI with subtotal occlusion with thrombus in LAD at 1st septal  branch. Successful OANH placed to LAD; LVEF=50%. Post-procedure he developed symptomatic junctional rhythm and sinus bradycardia requiring dual chamber pacemaker insertion on 5/24/13. Diagnosed with NSTEMI in March 2015 with peak TnI=67. He underwent cardiac that showed EF 25-30%. He had PCI S/P cutting balloon PTCA and BMS placement to LAD for ISR. Note he underwent PM device check October 2015 that showed NSVT and c/o chest pain. Subsequently, most recent LHC10/9/15 that showed recurrent ISR of LAD and CABG deemed necessary. He is s/p 2V CABG with LIMA-LAD and SVG-distal RCA on 10/14/15. Carotid doppler 10/9/15 less than 50% bilaterally. Most recent lexiscan myoview stress test 2/9/17 LVEF of 34%. Severe global wall motion with AK of the apical sepal wall; large sized severe defect in  the mid/apical anterior, septal, and entire inferior wall at stress and rest c/w scar. No ishecmia. Most recent ECHO 2/9/17 mild concentric LVH, EF of 30-35%; AK LAD territory;  Grade II DD with elevated filling pressure; mild MR; Mild AS; Moderate AI. Due to continued low EF despite CHF med titration I referred him to Dr Jacques Favre and he upgraded him on 7/12/17 to dual chamber PM/ICD. Most recent device check Feb 2021 with 8 NSVT which is baseline. He walks 10 miles daily. He smokes 10 cigarettes daily.   He never started on Eliquis due to the cost and is aware of the risk of stroke and REFUSES to take NOAC or coumadin. He only wants to continue DAPT. History of present illness:    Most recent EKG 2/2021 atrial paced; Anterior infarct. T wave inversions in lateral leads (no change 3/19). Today he reports that he has been feeling fairly well. He does admit to tingling and aching in his chest that occurs rarely. This does not last long and resolves on its own. He also admits to exertional SOB. This is chronic for him. He continues to walk 10 miles daily without any issues. He continues to smoke but is attempting to cut back. He continues to refuse stronger anticoagulation, understanding the risks. He has followed up with Dr. Juanita Daniel and was thought to be stable. He received both Moderna COVID vaccines. Patient currently denies any weight gain, edema, palpitations, dizziness, and syncope. Past Medical History:     has a past medical history of CAD (coronary artery disease), COPD (chronic obstructive pulmonary disease) ([facility] [address] Hypertension, and Systolic CHF, chronic ([facility] [address] Surgical History:   has a past surgical history that includes other surgical history (1999); Coronary angioplasty with stent (05/21/2013); pacemaker placement; Coronary artery bypass graft (10/14/2015); and hernia repair (08/2019). Social History:   He is  and lives at [facility] with his wife. He is retired. He was a self employed . Smoker. He reports that he drinks about 2-3 beers a few days a week. He reports that he does not use illicit drugs. Family History:  family history includes Diabetes in his mother; High Blood Pressure in his father, mother, and sister; Carleen  / [place] in his mother. Home Medications:  Prior to Admission medications    Medication Sig Start Date End Date Taking? Authorizing Provider   aspirin 81 MG chewable tablet Take 1 tablet by mouth daily.  5/25/13  Yes Jose Alberto Beaver CNP   clopidogrel (PLAVIX) 75 MG tablet Take 1 tablet by mouth daily. 5/25/13  Yes Brittany Moody CNP   lisinopril (PRINIVIL;ZESTRIL) 5 MG tablet Take 1 tablet by mouth daily. 5/25/13  Yes Paris Grant CNP   metoprolol (LOPRESSOR) 25 MG tablet Take 1 tablet by mouth 2 times daily. 5/25/13  Yes Brittany Moody CNP   simvastatin (ZOCOR) 20 MG tablet Take 1 tablet by mouth nightly. 5/25/13  Yes Paris Grant CNP        Allergies:  Patient has no known allergies. Review of Systems:   · Constitutional: there has been no unanticipated weight loss. There's been no change in energy level, sleep pattern, or activity level. · Eyes: No visual changes or diplopia. No scleral icterus. · ENT: No Headaches, hearing loss or vertigo. No mouth sores or sore throat. · Cardiovascular: Reviewed in HPI  · Respiratory: No cough or wheezing, no sputum production. No hematemesis. · Gastrointestinal: No abdominal pain, appetite loss, blood in stools. No change in bowel or bladder habits. · Genitourinary: No dysuria, trouble voiding, or hematuria. · Musculoskeletal:  No gait disturbance, weakness or joint complaints. · Integumentary: No rash or pruritis. · Neurological: No headache, diplopia, change in muscle strength, numbness or tingling. No change in gait, balance, coordination, mood, affect, memory, mentation, behavior. · Psychiatric: No anxiety, no depression. · Endocrine: No malaise, fatigue or temperature intolerance. No excessive thirst, fluid intake, or urination. No tremor. · Hematologic/Lymphatic: No abnormal bruising or bleeding, blood clots or swollen lymph nodes. · Allergic/Immunologic: No nasal congestion or hives.     Physical Examination:    Vitals:    04/28/21 0919   BP: 138/80   Pulse: 66   SpO2: 98%        Constitutional and General Appearance: NAD   Respiratory:  · Normal excursion and expansion without use of accessory muscles  · Resp Auscultation: Soft Normal breath sounds without HDL 27 (L) 11/07/2017     Lab Results   Component Value Date    LDLCALC 48 07/28/2020    LDLCALC 71 12/26/2018    LDLCALC 68 11/07/2017     Lab Results   Component Value Date    LABVLDL 33 07/28/2020    LABVLDL 39 12/26/2018    LABVLDL 27 11/07/2017     Assessment:     1. NSTEMI:  Resolved. Stable and will continue present medical regimen. Stable and will continue present medical regimen. There are no concerning symptoms for angina currently. I have strongly encouraged him to quit smoking entirely in past but he still has no interest.          2. CAD (coronary artery disease) Stable and will continue present medical regimen. He is s/p 2V CABG with LIMA-LAD and SVG-distal RCA on 10/14/15. Most recent stress test 2/9/17 LVEF of 34%; +scar without with no significant ischemia. 3. Cardiac pacemaker in situ:  Stable. Junctional rhythm and sinus bradycardia requiring dual chamber pacemaker insertion on 5/24/13. See # 2 above. I was concerned with large area of scar on stress testing, ischemic cardiomyopathy, and EF=30-35%. Therefore, he was upgraded him 7/12/17 to dual chamber ICD. Most recent device check Feb 2021 with 8 NSVT which is baseline. 4.  I personally reviewed most recent lipids 7/28/20. Results above. Overall well controlled at goal except lower HDL which will treat due to no proven clinical benefit. 5.  Ischemic cardiomyopathy:  Most recent ECHO 2/9/17 mild concentric LVH, EF of 30-35%. Clinically compensated NYHA Class II and will continue current CHF medical regimen. Clinically compensated NYHA Class II and will continue current CHF medical regimen. Plan:  1. Discussed the importance of smoking cessation. 2. Again reviewed stronger blood thinner. 3. Continue current medications as prescribed. 4. CMP, CBC, Lipids  5. Follow up with me in 9 months.      This note was scribed in the presence of Dr. Natasha Vines MD by Rosy Cheng RN.       I, Dr. Natasha Vines, personally performed the services described in this documentation, as scribed by the above signed scribe in my presence. It is both accurate and complete to my knowledge. I agree with the details independently gathered by the clinical support staff, while the remaining scribed note accurately describes my personal service to the patient. Cost of prescription medications and patient compliance have been reviewed with patient. All questions answered. Babs Santos.  Alayna Nassar M.D., [facility]

## 2021-04-28 NOTE — PATIENT INSTRUCTIONS
Plan:  1. Discussed the importance of smoking cessation. 2. Again reviewed stronger blood thinner. 3. Continue current medications as prescribed. 4. CMP, CBC, Lipids  5. Follow up with me in 9 months.

## 2021-06-07 ENCOUNTER — NURSE ONLY (OUTPATIENT)
Dept: CARDIOLOGY CLINIC | Age: 66
End: 2021-06-07
Payer: COMMERCIAL

## 2021-06-07 DIAGNOSIS — I47.20 VENTRICULAR TACHYCARDIA: ICD-10-CM

## 2021-06-07 DIAGNOSIS — I25.5 ISCHEMIC CARDIOMYOPATHY: ICD-10-CM

## 2021-06-07 DIAGNOSIS — Z95.810 ICD (IMPLANTABLE CARDIOVERTER-DEFIBRILLATOR) IN PLACE: ICD-10-CM

## 2021-06-07 DIAGNOSIS — I50.22 SYSTOLIC CHF, CHRONIC (HCC): ICD-10-CM

## 2021-06-07 DIAGNOSIS — I49.5 SINUS NODE DYSFUNCTION (HCC): ICD-10-CM

## 2021-06-07 NOTE — LETTER
[address] [facility] [phone]  [address] [phone]    Pacemaker/Defibrillator Clinic    06/11/21      Isa Nogueira  [address] [address]      Dear Isa Nogueira    This letter is to inform you that we received the transmission from your monitor at home that checks your implanted heart device. The next date your monitor will automatically transmit will be 10/4. If your report needs attention we will notify you. Your device and monitor are wireless and most transmit cellularly, but please periodically check your monitor is still plugged in to the electrical outlet. If you still use the telephone land line to send please ensure the connection to the phone noa is secure. This will help to ensure successful automatic transmissions in the future. Also, the monitor needs to be close to you while sleeping at night. Please be aware that the remote device transmission sites are periodically monitored only during regular business hours during which simultaneous in-office device clinics are being run. If your transmission requires attention, we will contact you as soon as possible. **PLEASE NOTE** that our [facility] policy and processes are changing to ensure a more seamless approach for all parties involved, allowing more time for our nurses to address patient issues and concerns. We will no longer be sending letters for NORMAL remote transmissions. You will be contacted by phone if your transmission requires attention (as previously done), and letters will only be sent regarding monitor disconnections or missed transmissions if you are unable to be reached by phone. Please do not be alarmed by this new process, as we will continue to contact you if your transmission report requires attention. This will be your final \"remote received\" letter.   From this point forward, the [facility] will be utilizing the no news is good news approach. As always, please feel free to contact your nurse with any questions or concerns. Thank you.     [facility]

## 2021-06-08 NOTE — PROGRESS NOTES
Remote transmission received for patients dual chamber ICD. Transmission shows normal sensing and pacing function. EP physician will review. See interrogation under the cardiology tab in the [address] [place] [address] [address] field for more details. Will continue to monitor remotely. pvc count increased. 7 nsvt-longest 5 sec (coreg). Thoracic impedance trend stable.

## 2021-06-11 PROCEDURE — 93297 REM INTERROG DEV EVAL ICPMS: CPT | Performed by: INTERNAL MEDICINE

## 2021-06-11 PROCEDURE — 93295 DEV INTERROG REMOTE 1/2/MLT: CPT | Performed by: INTERNAL MEDICINE

## 2021-06-11 PROCEDURE — 93296 REM INTERROG EVL PM/IDS: CPT | Performed by: INTERNAL MEDICINE

## 2021-10-04 ENCOUNTER — NURSE ONLY (OUTPATIENT)
Dept: CARDIOLOGY CLINIC | Age: 66
End: 2021-10-04
Payer: COMMERCIAL

## 2021-10-04 DIAGNOSIS — Z95.810 ICD (IMPLANTABLE CARDIOVERTER-DEFIBRILLATOR) IN PLACE: ICD-10-CM

## 2021-10-04 DIAGNOSIS — I47.20 VENTRICULAR TACHYCARDIA: ICD-10-CM

## 2021-10-04 DIAGNOSIS — I25.5 ISCHEMIC CARDIOMYOPATHY: ICD-10-CM

## 2021-10-04 DIAGNOSIS — I50.22 SYSTOLIC CHF, CHRONIC (HCC): ICD-10-CM

## 2021-10-04 DIAGNOSIS — I49.5 SINUS NODE DYSFUNCTION (HCC): ICD-10-CM

## 2021-10-04 PROCEDURE — 93297 REM INTERROG DEV EVAL ICPMS: CPT | Performed by: INTERNAL MEDICINE

## 2021-10-04 PROCEDURE — 93295 DEV INTERROG REMOTE 1/2/MLT: CPT | Performed by: INTERNAL MEDICINE

## 2021-10-04 PROCEDURE — 93296 REM INTERROG EVL PM/IDS: CPT | Performed by: INTERNAL MEDICINE

## 2022-01-03 ENCOUNTER — NURSE ONLY (OUTPATIENT)
Dept: CARDIOLOGY CLINIC | Age: 67
End: 2022-01-03
Payer: COMMERCIAL

## 2022-01-03 DIAGNOSIS — I47.20 VENTRICULAR TACHYCARDIA: ICD-10-CM

## 2022-01-03 DIAGNOSIS — I49.5 SINUS NODE DYSFUNCTION (HCC): ICD-10-CM

## 2022-01-03 DIAGNOSIS — I25.5 ISCHEMIC CARDIOMYOPATHY: ICD-10-CM

## 2022-01-03 DIAGNOSIS — Z95.810 ICD (IMPLANTABLE CARDIOVERTER-DEFIBRILLATOR) IN PLACE: ICD-10-CM

## 2022-01-03 DIAGNOSIS — I50.22 SYSTOLIC CHF, CHRONIC (HCC): ICD-10-CM

## 2022-01-04 PROCEDURE — 93297 REM INTERROG DEV EVAL ICPMS: CPT | Performed by: INTERNAL MEDICINE

## 2022-01-04 PROCEDURE — 93296 REM INTERROG EVL PM/IDS: CPT | Performed by: INTERNAL MEDICINE

## 2022-01-04 PROCEDURE — 93295 DEV INTERROG REMOTE 1/2/MLT: CPT | Performed by: INTERNAL MEDICINE

## 2022-01-31 ENCOUNTER — HOSPITAL ENCOUNTER (OUTPATIENT)
Age: 67
Discharge: HOME | End: 2022-01-31
Payer: COMMERCIAL

## 2022-01-31 DIAGNOSIS — I25.10 CORONARY ARTERY DISEASE INVOLVING NATIVE CORONARY ARTERY OF NATIVE HEART WITHOUT ANGINA PECTORIS: ICD-10-CM

## 2022-01-31 LAB
A/G RATIO: 1.5 (ref 1.1–2.2)
ALBUMIN SERPL-MCNC: 4.3 G/DL (ref 3.4–5)
ALP BLD-CCNC: 56 U/L (ref 40–129)
ALT SERPL-CCNC: 19 U/L (ref 10–40)
ANION GAP SERPL CALCULATED.3IONS-SCNC: 9 MMOL/L (ref 3–16)
AST SERPL-CCNC: 22 U/L (ref 15–37)
BASOPHILS ABSOLUTE: 0.1 K/UL (ref 0–0.2)
BASOPHILS RELATIVE PERCENT: 1.2 %
BILIRUB SERPL-MCNC: 0.3 MG/DL (ref 0–1)
BUN BLDV-MCNC: 11 MG/DL (ref 7–20)
CALCIUM SERPL-MCNC: 10 MG/DL (ref 8.3–10.6)
CHLORIDE BLD-SCNC: 102 MMOL/L (ref 99–110)
CHOLESTEROL, TOTAL: 118 MG/DL (ref 0–199)
CO2: 28 MMOL/L (ref 21–32)
CREAT SERPL-MCNC: 0.8 MG/DL (ref 0.8–1.3)
EOSINOPHILS ABSOLUTE: 0.1 K/UL (ref 0–0.6)
EOSINOPHILS RELATIVE PERCENT: 2.2 %
GFR AFRICAN AMERICAN: >60
GFR NON-AFRICAN AMERICAN: >60
GLUCOSE BLD-MCNC: 100 MG/DL (ref 70–99)
HCT VFR BLD CALC: 44.9 % (ref 40.5–52.5)
HDLC SERPL-MCNC: 34 MG/DL (ref 40–60)
HEMOGLOBIN: 15 G/DL (ref 13.5–17.5)
LDL CHOLESTEROL CALCULATED: 66 MG/DL
LYMPHOCYTES ABSOLUTE: 1.6 K/UL (ref 1–5.1)
LYMPHOCYTES RELATIVE PERCENT: 26.9 %
MCH RBC QN AUTO: 30.1 PG (ref 26–34)
MCHC RBC AUTO-ENTMCNC: 33.5 G/DL (ref 31–36)
MCV RBC AUTO: 89.9 FL (ref 80–100)
MONOCYTES ABSOLUTE: 0.5 K/UL (ref 0–1.3)
MONOCYTES RELATIVE PERCENT: 8.9 %
NEUTROPHILS ABSOLUTE: 3.6 K/UL (ref 1.7–7.7)
NEUTROPHILS RELATIVE PERCENT: 60.8 %
PDW BLD-RTO: 13.8 % (ref 12.4–15.4)
PLATELET # BLD: 152 K/UL (ref 135–450)
PMV BLD AUTO: 11.5 FL (ref 5–10.5)
POTASSIUM SERPL-SCNC: 4 MMOL/L (ref 3.5–5.1)
RBC # BLD: 4.99 M/UL (ref 4.2–5.9)
SODIUM BLD-SCNC: 139 MMOL/L (ref 136–145)
TOTAL PROTEIN: 7.1 G/DL (ref 6.4–8.2)
TRIGL SERPL-MCNC: 92 MG/DL (ref 0–150)
VLDLC SERPL CALC-MCNC: 18 MG/DL
WBC # BLD: 5.9 K/UL (ref 4–11)

## 2022-01-31 PROCEDURE — 80061 LIPID PANEL: CPT

## 2022-01-31 PROCEDURE — 80053 COMPREHEN METABOLIC PANEL: CPT

## 2022-01-31 PROCEDURE — 36415 COLL VENOUS BLD VENIPUNCTURE: CPT

## 2022-01-31 PROCEDURE — 85025 COMPLETE CBC W/AUTO DIFF WBC: CPT

## 2022-01-31 NOTE — PROGRESS NOTES
[address]   Cardiac Followup    Referring Provider:  Judy Nicholson MD     Chief Complaint   Patient presents with    Follow-up     9 mo      Subjective: Patient being seen today for cardiology follow up of CAD, PAF, CMP, HTN, smoker, s/p ICD; no complaints today. Past Medical History:   Mr. Pee Bashir is a 67yo male who has PMH PAF (refuses NOAC or coumadin), CAD s/p NSTEMI and BMS for ISR of LAD on 03/12/2015, s/p 2V CABG 10/15, ischemic CM (EF=30-35%), and s/p dual chamber/ICD upgrade 7/17. In May 2013 S/P anterior STEMI with subtotal occlusion with thrombus in LAD at 1st septal  branch. Successful OANH placed to LAD; LVEF=50%. Post-procedure he developed symptomatic junctional rhythm and sinus bradycardia requiring dual chamber pacemaker insertion on 5/24/13. Diagnosed with NSTEMI in 3/15. He underwent LHC showing EF 25-30%. He had PCI S/P cutting balloon PTCA and BMS to LAD for ISR. Note he underwent PM device check October 2015 that showed NSVT and c/o chest pain. Subsequently, most recent LHC10/9/15 that showed recurrent ISR of LAD and CABG deemed necessary. He is s/p 2V CABG with LIMA-LAD and SVG-distal RCA on 10/14/15. Carotid doppler 10/9/15 less than 50% bilaterally. Most recent lexiscan myoview stress test 2/9/17 LVEF of 34%; large sized severe defect in  mid/apical anterior, septal, and entire inferior wall at stress and rest c/w scar. No ischemia. Most recent ECHO 2/9/17 mild concentric LVH, EF of 30-35%; AK LAD territory;  Grade II DD with elevated filling pressure; mild MR; Mild AS; Moderate AI. Due to continued low EF despite CHF med titration I referred him to Dr Ryan Wallis and he upgraded him on 7/12/17 to dual chamber PM/ICD. Most recent device check Feb 2021 with 8 NSVT which is baseline. He REFUSES to take NOAC or coumadin and understands stroke risk. He only wants to continue DAPT. Most recent EKG from 2/25/21 atrial paced; Anterior infarct.  T wave inversions in lateral leads (no change 3/19). He received both Moderna COVID vaccines. History of present illness: Today he is here for cardiology follow up. Most recent device check from 1/4/2022 showed 8 NSVT, since 10/4/2021. 1 Monitored AT/AF on 07-Oct-2021 @ 0921, Total  0.3% (MVP On); AP 99.9%. Thoracic impedance trend stable. He states he feels like a million bucks. No change in his medications since his last visit. No complaints today. He is down to 8 cigarettes daily. Patient currently denies any weight gain, edema, palpitations, chest pain, shortness of breath, dizziness, and syncope. He walks 10 miles daily. Past Medical History:     has a past medical history of CAD (coronary artery disease), COPD (chronic obstructive pulmonary disease) ([facility] Hypertension, and Systolic CHF, chronic ([facility] Surgical History:   has a past surgical history that includes other surgical history (1999); Coronary angioplasty with stent (05/21/2013); pacemaker placement; Coronary artery bypass graft (10/14/2015); and hernia repair (08/2019). Social History:   He is  and lives at [facility] with his wife. He is retired. He was a self employed . Smoker. He reports that he drinks about 2-3 beers a few days a week. He reports that he does not use illicit drugs. Family History:  family history includes Diabetes in his mother; High Blood Pressure in his father, mother, and sister; Natalie Lambert / Mekai in his mother. Home Medications:  Prior to Admission medications    Medication Sig Start Date End Date Taking? Authorizing Provider   aspirin 81 MG chewable tablet Take 1 tablet by mouth daily. 5/25/13  Yes Rosanne Bradley CNP   clopidogrel (PLAVIX) 75 MG tablet Take 1 tablet by mouth daily. 5/25/13  Yes Brittany Moody CNP   lisinopril (PRINIVIL;ZESTRIL) 5 MG tablet Take 1 tablet by mouth daily.  5/25/13  Yes Rosanne Bradley CNP   metoprolol (LOPRESSOR) 25 MG tablet Take 1 tablet by mouth 2 times daily. 5/25/13  Yes Brittany Moody CNP   simvastatin (ZOCOR) 20 MG tablet Take 1 tablet by mouth nightly. 5/25/13  Yes Tisha Farley CNP        Allergies:  Patient has no known allergies. Review of Systems:   · Constitutional: there has been no unanticipated weight loss. There's been no change in energy level, sleep pattern, or activity level. · Eyes: No visual changes or diplopia. No scleral icterus. · ENT: No Headaches, hearing loss or vertigo. No mouth sores or sore throat. · Cardiovascular: Reviewed in HPI  · Respiratory: No cough or wheezing, no sputum production. No hematemesis. · Gastrointestinal: No abdominal pain, appetite loss, blood in stools. No change in bowel or bladder habits. · Genitourinary: No dysuria, trouble voiding, or hematuria. · Musculoskeletal:  No gait disturbance, weakness or joint complaints. · Integumentary: No rash or pruritis. · Neurological: No headache, diplopia, change in muscle strength, numbness or tingling. No change in gait, balance, coordination, mood, affect, memory, mentation, behavior. · Psychiatric: No anxiety, no depression. · Endocrine: No malaise, fatigue or temperature intolerance. No excessive thirst, fluid intake, or urination. No tremor. · Hematologic/Lymphatic: No abnormal bruising or bleeding, blood clots or swollen lymph nodes. · Allergic/Immunologic: No nasal congestion or hives.     Physical Examination:    Vitals:    02/01/22 0959   BP: 106/74   Pulse: 67   SpO2: 98%        Constitutional and General Appearance: NAD   Respiratory:  · Normal excursion and expansion without use of accessory muscles  · Resp Auscultation: Soft Normal breath sounds without dullness  Cardiovascular:  · The apical impulses not displaced  · Heart tones are crisp and normal  · Cervical veins are not engorged  · The carotid upstroke is normal in amplitude and contour without delay or bruit  · Normal S1S2, No S3, Soft 2/6 LAST  · Peripheral pulses are symmetrical and full  · There is no clubbing, cyanosis of the extremities.   · No edema  · Femoral Arteries: 2+ and equal  · Pedal Pulses: 2+ and equal   Abdomen:  · No masses or tenderness  · Liver/Spleen: No Abnormalities Noted  Neurological/Psychiatric:  · Alert and oriented in all spheres  · Moves all extremities well  · Exhibits normal gait balance and coordination  · No abnormalities of mood, affect, memory, mentation, or behavior are noted  ·   Lab Results   Component Value Date    CHOL 118 01/31/2022    CHOL 109 07/28/2020    CHOL 140 12/26/2018     Lab Results   Component Value Date    TRIG 92 01/31/2022    TRIG 167 (H) 07/28/2020    TRIG 194 (H) 12/26/2018     Lab Results   Component Value Date    HDL 34 (L) 01/31/2022    HDL 28 (L) 07/28/2020    HDL 30 (L) 12/26/2018     Lab Results   Component Value Date    LDLCALC 66 01/31/2022    LDLCALC 48 07/28/2020    LDLCALC 71 12/26/2018     Lab Results   Component Value Date    LABVLDL 18 01/31/2022    LABVLDL 33 07/28/2020    LABVLDL 39 12/26/2018     No results found for: CHOLHDLRATIO  Lab Results   Component Value Date    CREATININE 0.8 01/31/2022    BUN 11 01/31/2022     01/31/2022    K 4.0 01/31/2022     01/31/2022    CO2 28 01/31/2022     Lab Results   Component Value Date    ALT 19 01/31/2022    AST 22 01/31/2022    ALKPHOS 56 01/31/2022    BILITOT 0.3 01/31/2022       Lab Results   Component Value Date    CHOL 118 01/31/2022    CHOL 109 07/28/2020    CHOL 140 12/26/2018     Lab Results   Component Value Date    TRIG 92 01/31/2022    TRIG 167 (H) 07/28/2020    TRIG 194 (H) 12/26/2018     Lab Results   Component Value Date    HDL 34 (L) 01/31/2022    HDL 28 (L) 07/28/2020    HDL 30 (L) 12/26/2018     Lab Results   Component Value Date    LDLCALC 66 01/31/2022    LDLCALC 48 07/28/2020    LDLCALC 71 12/26/2018     Lab Results   Component Value Date    LABVLDL 18 01/31/2022    LABVLDL 33 07/28/2020    LABVLDL 39 12/26/2018     Assessment:     1. NSTEMI:  Resolved. Stable and will continue present medical regimen. Stable and will continue present medical regimen. There are no concerning symptoms for angina currently. I have strongly encouraged him to quit smoking entirely in past but he still has no interest.          2. CAD (coronary artery disease) Stable and will continue present medical regimen. He is s/p 2V CABG with LIMA-LAD and SVG-distal RCA on 10/14/15. Most recent stress test 2/9/17 LVEF of 34%; +scar without with no significant ischemia. 3. Cardiac pacemaker in situ:  Stable. Junctional rhythm and sinus bradycardia requiring dual chamber pacemaker insertion on 5/24/13. See # 2 above. I was concerned with large area of scar on stress testing, ischemic cardiomyopathy, and EF=30-35%. Therefore, he was upgraded him 7/12/17 to dual chamber ICD. Most recent device check Jan 2022 with 8 NSVT; 1 episode afib which is baseline. 4.  I personally reviewed most recent lipids 1/31/22. Results above. Overall well controlled at goal except lower HDL which will treat due to no proven clinical benefit. 5.  Ischemic cardiomyopathy:  Most recent ECHO 2/9/17 mild concentric LVH, EF of 30-35%. Clinically compensated NYHA Class II and will continue current CHF medical regimen. Clinically compensated NYHA Class II and will continue current CHF medical regimen. 6. PAF: Short duration. He refuses AC and understands stroke risk. Will continue DAPT only. Plan:  1. Discussed the importance of smoking cessation. 2. Continue current medications- no changes at this time. Refills sent to pharmacy   3. Follow up with me in 1 year. 4. He would like to hold off on testing since he is feeling well. Will consider testing in the next few years or if you start to have any symptoms   5. Follow up with EP this March as scheduled       Scribe's attestation:   This note was scribed in the presence of Dr. Rudy Morocho MD

## 2022-02-01 ENCOUNTER — OFFICE VISIT (OUTPATIENT)
Dept: CARDIOLOGY CLINIC | Age: 67
End: 2022-02-01
Payer: COMMERCIAL

## 2022-02-01 VITALS
OXYGEN SATURATION: 98 % | DIASTOLIC BLOOD PRESSURE: 74 MMHG | SYSTOLIC BLOOD PRESSURE: 106 MMHG | HEART RATE: 67 BPM | HEIGHT: 68 IN | WEIGHT: 169 LBS | BODY MASS INDEX: 25.61 KG/M2

## 2022-02-01 DIAGNOSIS — Z95.1 S/P CABG X 2: ICD-10-CM

## 2022-02-01 DIAGNOSIS — Z72.0 TOBACCO ABUSE: ICD-10-CM

## 2022-02-01 DIAGNOSIS — I47.20 VENTRICULAR TACHYCARDIA: ICD-10-CM

## 2022-02-01 DIAGNOSIS — I25.10 CORONARY ARTERY DISEASE INVOLVING NATIVE CORONARY ARTERY OF NATIVE HEART WITHOUT ANGINA PECTORIS: Primary | ICD-10-CM

## 2022-02-01 DIAGNOSIS — I10 ESSENTIAL HYPERTENSION: ICD-10-CM

## 2022-02-01 DIAGNOSIS — Z95.0 CARDIAC PACEMAKER IN SITU: ICD-10-CM

## 2022-02-01 DIAGNOSIS — I48.0 PAF (PAROXYSMAL ATRIAL FIBRILLATION) (HCC): ICD-10-CM

## 2022-02-01 PROCEDURE — G8484 FLU IMMUNIZE NO ADMIN: HCPCS | Performed by: INTERNAL MEDICINE

## 2022-02-01 PROCEDURE — 3017F COLORECTAL CA SCREEN DOC REV: CPT | Performed by: INTERNAL MEDICINE

## 2022-02-01 PROCEDURE — G8427 DOCREV CUR MEDS BY ELIG CLIN: HCPCS | Performed by: INTERNAL MEDICINE

## 2022-02-01 PROCEDURE — G8417 CALC BMI ABV UP PARAM F/U: HCPCS | Performed by: INTERNAL MEDICINE

## 2022-02-01 PROCEDURE — 1123F ACP DISCUSS/DSCN MKR DOCD: CPT | Performed by: INTERNAL MEDICINE

## 2022-02-01 PROCEDURE — 99214 OFFICE O/P EST MOD 30 MIN: CPT | Performed by: INTERNAL MEDICINE

## 2022-02-01 PROCEDURE — 4004F PT TOBACCO SCREEN RCVD TLK: CPT | Performed by: INTERNAL MEDICINE

## 2022-02-01 PROCEDURE — 4040F PNEUMOC VAC/ADMIN/RCVD: CPT | Performed by: INTERNAL MEDICINE

## 2022-02-01 RX ORDER — CARVEDILOL 25 MG/1
25 TABLET ORAL 2 TIMES DAILY
Qty: 180 TABLET | Refills: 3 | Status: SHIPPED | OUTPATIENT
Start: 2022-02-01

## 2022-02-01 RX ORDER — LISINOPRIL 10 MG/1
TABLET ORAL
Qty: 90 TABLET | Refills: 3 | Status: SHIPPED | OUTPATIENT
Start: 2022-02-01

## 2022-02-01 RX ORDER — CLOPIDOGREL BISULFATE 75 MG/1
75 TABLET ORAL DAILY
Qty: 90 TABLET | Refills: 3 | Status: SHIPPED | OUTPATIENT
Start: 2022-02-01

## 2022-02-01 RX ORDER — ATORVASTATIN CALCIUM 80 MG/1
80 TABLET, FILM COATED ORAL NIGHTLY
Qty: 90 TABLET | Refills: 3 | Status: SHIPPED | OUTPATIENT
Start: 2022-02-01

## 2022-02-01 NOTE — PATIENT INSTRUCTIONS
Plan:  1. Discussed the importance of smoking cessation. 2. Continue current medications- no changes at this time. Refills sent to pharmacy   3. Follow up with me in 1 year. 4. He would like to hold off on testing since he is feeling well.  Will consider testing in the next few years or if you start to have any symptoms

## 2022-03-08 ENCOUNTER — OFFICE VISIT (OUTPATIENT)
Dept: CARDIOLOGY CLINIC | Age: 67
End: 2022-03-08
Payer: COMMERCIAL

## 2022-03-08 ENCOUNTER — NURSE ONLY (OUTPATIENT)
Dept: CARDIOLOGY CLINIC | Age: 67
End: 2022-03-08
Payer: COMMERCIAL

## 2022-03-08 VITALS
BODY MASS INDEX: 25.99 KG/M2 | DIASTOLIC BLOOD PRESSURE: 78 MMHG | HEART RATE: 59 BPM | HEIGHT: 68 IN | SYSTOLIC BLOOD PRESSURE: 120 MMHG | WEIGHT: 171.5 LBS | OXYGEN SATURATION: 100 %

## 2022-03-08 DIAGNOSIS — I47.20 VENTRICULAR TACHYCARDIA: Primary | ICD-10-CM

## 2022-03-08 DIAGNOSIS — I47.20 VENTRICULAR TACHYCARDIA: ICD-10-CM

## 2022-03-08 DIAGNOSIS — Z95.810 ICD (IMPLANTABLE CARDIOVERTER-DEFIBRILLATOR) IN PLACE: ICD-10-CM

## 2022-03-08 DIAGNOSIS — I25.5 ISCHEMIC CARDIOMYOPATHY: ICD-10-CM

## 2022-03-08 DIAGNOSIS — I49.5 SINUS NODE DYSFUNCTION (HCC): ICD-10-CM

## 2022-03-08 DIAGNOSIS — I48.0 PAF (PAROXYSMAL ATRIAL FIBRILLATION) (HCC): ICD-10-CM

## 2022-03-08 DIAGNOSIS — Z95.810 DUAL ICD (IMPLANTABLE CARDIOVERTER-DEFIBRILLATOR) IN PLACE: ICD-10-CM

## 2022-03-08 DIAGNOSIS — Z95.0 CARDIAC PACEMAKER IN SITU: ICD-10-CM

## 2022-03-08 DIAGNOSIS — I50.22 SYSTOLIC CHF, CHRONIC (HCC): ICD-10-CM

## 2022-03-08 PROCEDURE — G8484 FLU IMMUNIZE NO ADMIN: HCPCS | Performed by: INTERNAL MEDICINE

## 2022-03-08 PROCEDURE — 3017F COLORECTAL CA SCREEN DOC REV: CPT | Performed by: INTERNAL MEDICINE

## 2022-03-08 PROCEDURE — 1123F ACP DISCUSS/DSCN MKR DOCD: CPT | Performed by: INTERNAL MEDICINE

## 2022-03-08 PROCEDURE — G8417 CALC BMI ABV UP PARAM F/U: HCPCS | Performed by: INTERNAL MEDICINE

## 2022-03-08 PROCEDURE — G8427 DOCREV CUR MEDS BY ELIG CLIN: HCPCS | Performed by: INTERNAL MEDICINE

## 2022-03-08 PROCEDURE — 93283 PRGRMG EVAL IMPLANTABLE DFB: CPT | Performed by: INTERNAL MEDICINE

## 2022-03-08 PROCEDURE — 99214 OFFICE O/P EST MOD 30 MIN: CPT | Performed by: INTERNAL MEDICINE

## 2022-03-08 PROCEDURE — 4004F PT TOBACCO SCREEN RCVD TLK: CPT | Performed by: INTERNAL MEDICINE

## 2022-03-08 PROCEDURE — 93290 INTERROG DEV EVAL ICPMS IP: CPT | Performed by: INTERNAL MEDICINE

## 2022-03-08 PROCEDURE — 4040F PNEUMOC VAC/ADMIN/RCVD: CPT | Performed by: INTERNAL MEDICINE

## 2022-03-08 NOTE — PROGRESS NOTES
Patient presents to the device clinic today for a programming evaluation for his defibrillator. Patient has a history of VT,SND, CM, and pAF. Takes Coreg and Plavix. Last device interrogation was on 1/3. Since then, numerous NSVT events recorded - #112 shows some undersensing of the VT event. TI near baseline. 148.1 PVC/hr. P wave: 3.8 mV  R wave: 18.0 mV    AP 99.8%  0.2%    All sensing and pacing parameters are within normal range. No changes need to be made at this time. Patient will see Dr. Francois Robertson today in office. Patient education was provided about device functionality, in home monitoring, and any other patient questions and/or concerns were addressed. Patient voices understanding. Patient will follow up in 3 months in office or remotely. Please see interrogation for more detail - Paceart report located under the Cardiology tab.

## 2022-03-08 NOTE — PATIENT INSTRUCTIONS
RECOMMENDATIONS:  1. Continue with remote device checks every 3 months. 2. Continue current medications as prescribed. 3. Follow up in one year, or sooner if needed.

## 2022-03-08 NOTE — LETTER
Daina Yung  1955    [facility]   Electrophysiology Consult Note           Date:  March 8, 2022  Patient name: Daina Yung  YOB: 1955    Primary Care Physician: Parag Myrick MD    CHIEF COMPLAINT:   Chief Complaint   Patient presents with    1 Year Follow Up    Other     device management     Atrial Fibrillation     HISTORY OF PRESENT ILLNESS: Daina Yung is a 69 y.o. male with a history of CAD, ischemic cardiomyopathy, PAF, sinus node dysfunction, HTN, chronic combined CHF, aortic valve disease, tobacco abuse, and COPD. He had a dual chamber pacemaker implanted in 2013. He had a VT, a LHC, then CABG in 10/2015. EF was 35%. Stress test 2/2017 showed fixed defects. He had a repeat echo in 5/2017 and EF was 32%. In 7/2017 he had an upgrade to a dual chamber ICD. PAF has been detected and he has declined anticoagulation. On 11/25/2020, patient complains of chronic intermittent chest 'tingling' and chronic SOB. Denies palpitations and dizziness. On 2/25/2021, patient's device interrogation demonstrates %,  <1% 8 NSVT episodes 3-4 beats 1-2 seconds duration, last 2/14/2021. His EKG on 2/25/2021, shows a- paced rate 65. He reports that he has been feeling well from a cardiac standpoint. He does admit to chronic shortness of breath that he feels is stable. This does not limit him. He continues to work every day and tolerates this well. Interval History since last office visit:  Today, 3/8/2022, ECG demonstrates paced (64). Device interrogation demonstrated AP 99.8%,  0.2%, 148 PVC per hour, CECILE 3.0 years. He states that he is doing well. He is taking his medications as prescribed. Patient denies current edema, chest pain, sob, palpitations, dizziness or syncope.      Past Medical History:   has a past medical history of CAD (coronary artery disease), COPD (chronic obstructive pulmonary disease) ([address] Hypertension, and Systolic CHF, chronic ([facility] Past Surgical History:   has a past surgical history that includes other surgical history (1999); Coronary angioplasty with stent (05/21/2013); pacemaker placement; Coronary artery bypass graft (10/14/2015); and hernia repair (08/2019). Allergies:  Patient has no known allergies. Social History:   reports that he has been smoking cigarettes. He has a 10.00 pack-year smoking history. He has never used smokeless tobacco. He reports that he does not drink alcohol and does not use drugs. Family History: family history includes Diabetes in his mother; High Blood Pressure in his father, mother, and sister; Janet Heaton / Mekai in his mother. Home Medications:    Prior to Admission medications    Medication Sig Start Date End Date Taking? Authorizing Provider   atorvastatin (LIPITOR) 80 MG tablet Take 1 tablet by mouth nightly 2/1/22  Yes Nena Hoyos MD   clopidogrel (PLAVIX) 75 MG tablet Take 1 tablet by mouth daily 2/1/22  Yes Nena Hoyos MD   carvedilol (COREG) 25 MG tablet Take 1 tablet by mouth 2 times daily TAKE 1 TABLET BY MOUTH TWO  TIMES DAILY 2/1/22  Yes Nena Hoyos MD   lisinopril (PRINIVIL;ZESTRIL) 10 MG tablet TAKE 1 TABLET BY MOUTH  DAILY 2/1/22  Yes Nena Hoyos MD   aspirin 81 MG chewable tablet Take 1 tablet by mouth daily. 5/25/13  Yes JOHN PAUL Alonzo - CNP       REVIEW OF SYSTEMS:    All 14-point review of systems are completed and  pertinent positives are mentioned in the history of present illness. Other  systems are reviewed and are negative. Physical Examination:    /78   Pulse 59   Ht 5' 8\" (1.727 m)   Wt 171 lb 8 oz (77.8 kg)   SpO2 100%   BMI 26.08 kg/m²      Constitutional and General Appearance:    alert, cooperative, no distress and appears stated age  [de-identified]:    PERRLA, no cervical lymphadenopathy. No masses palpable.  Normal oral  mucosa  Respiratory:  · Normal excursion and expansion without use of accessory muscles  · Resp Auscultation: Normal breath sounds without dullness or wheezing  Cardiovascular:  · The apical impulse is not displaced  · Heart tones are crisp and normal. regular S1 and S2.  · Grade II/VI systolic murmur best LLSB  Abdomen:  · No masses or tenderness  · Bowel sounds present  Extremities:  ·  No Cyanosis or Clubbing  ·  Lower extremity edema: No  · Skin: Warm and dry  Neurological:  · Alert and oriented. · Moves all extremities well  · No abnormalities of mood, affect, memory, mentation, or behavior are noted    DATA:    ECG 3/8/22: Personally reviewed. IMPRESSION:    1. Ventricular tachycardia ([address]    2. PAF (paroxysmal atrial fibrillation) ([address]    3. Dual ICD (implantable cardioverter-defibrillator) in place    4. Sinus node dysfunction (HCC)    5. Cardiac pacemaker in situ      1. Non-sustained ventricular tachycardia/ventricular tachycardia. Patient is a pleasant 57-year-old male with a medical regimen for ventricular tachycardia prior to CABG, ischemic cardiomyopathy status post CABG, paroxysmal atrial fibrillation not on anticoagulation per patient's preference, hypertension, hyperlipidemia, sick sinus syndrome status post dual-chamber ICD who presents from home to [facility] care. She has had no shocks from his device. His continues have some nonsustained ventricular tachycardia on his device but nothing lasting more we discussed potential options including antiarrhythmic therapy and ablation. At this point I see no indication either of these options at this point. We will continue remote monitoring for now. Follow-up with EP NP in 1 year. - Continue GDMT with carvedilol.  - Continue remote monitoring. 3/8/2022  Patient continues to have short NSVT. Asymptomatic.  - Continue GDMT with carvedilol.  - Continue remote monitoring.     2. Sick sinus syndrome status post DC ICD ([employer]). Patient has history of sick sinus syndrome. He is status post dual-chamber ICD.   He is atrially paced approximately 100% of the time and ventricular pacing approximately 1% of the time. Device function appropriate and stable. - Plan as per above. 3/8/2022  Device function appropriate. Atrial pacing nearly 100% of the time. - Plan as per above.     3. Paroxysmal atrial fibrillation ([id number]). Patient with history of paroxysmal atrial fibrillation. His [id number] score is 4. Patient not interested in anticoagulation at this point. No atrial fibrillation on most recent check. - Continue coreg. 3/8/2022  Patient continues short (less than 5 minutes of atrial fibrillation) runs of atrial fibrillation.  - Continue coreg.     4. Ischemic cardiomyopathy status post CABG. Stable. Chest pain unchanged and continues to smoke. - Continue to follow up with Dr. Yuly Shearer. 3/8/2022  - Per NEVILLE/Gianni.     5. COPD.     RECOMMENDATIONS:  1. Continue with remote device checks every 3 months. 2. Continue current medications as prescribed. 3. Follow up in one year, or sooner if needed. QUALITY MEASURES  1. Tobacco Cessation Counseling: Yes  2. Retake of BP if >140/90:   NA  3. Documentation to PCP/referring for new patient:  Sent to PCP at close of office visit  4. CAD patient on anti-platelet: Yes  5. CAD patient on STATIN therapy:  Yes  6. Patient with CHF and aFib on anticoagulation:  No     All questions and concerns were addressed to the patient/family. Alternatives to my treatment were discussed. Dr. Tamika Agee MD  Electrophysiology  [facility]. [address] [place] [address]. [address]. [place], [address]  Phone: [phone]  Fax: [phone]     I, Wanda Wright, DAO, am scribing for and in the presence of Dr. Jennifer Jolly. 03/08/22 11:11 AM   Wanda Wright RN    The scribe's documentation has been prepared under my direction and personally reviewed by me in its entirety.  I confirm that the note above accurately reflects all work, physical examination, the discussion of treatments and procedures, and medical decision making performed by me. Rebecca Chang MD personally performed the services described in this documentation as scribed by nurse in my presence, and is both accurate and complete. Electronically signed by Katrina Velazquez MD on 3/8/2022 at 12:22 PM     NOTE: This report was transcribed using voice recognition software. Every effort was made to ensure accuracy, however, inadvertent computerized transcription errors may be present.

## 2022-03-08 NOTE — PROGRESS NOTES
[address]   Electrophysiology Consult Note           Date:  March 8, 2022  Patient name: Cathlene Alpers  YOB: 1955    Primary Care Physician: Tracie Snyder MD    CHIEF COMPLAINT:   Chief Complaint   Patient presents with    1 Year Follow Up    Other     device management     Atrial Fibrillation     HISTORY OF PRESENT ILLNESS: Cathlene Alpers is a 69 y.o. male with a history of CAD, ischemic cardiomyopathy, PAF, sinus node dysfunction, HTN, chronic combined CHF, aortic valve disease, tobacco abuse, and COPD. He had a dual chamber pacemaker implanted in 2013. He had a VT, a LHC, then CABG in 10/2015. EF was 35%. Stress test 2/2017 showed fixed defects. He had a repeat echo in 5/2017 and EF was 32%. In 7/2017 he had an upgrade to a dual chamber ICD. PAF has been detected and he has declined anticoagulation. On 11/25/2020, patient complains of chronic intermittent chest 'tingling' and chronic SOB. Denies palpitations and dizziness. On 2/25/2021, patient's device interrogation demonstrates %,  <1% 8 NSVT episodes 3-4 beats 1-2 seconds duration, last 2/14/2021. His EKG on 2/25/2021, shows a- paced rate 65. He reports that he has been feeling well from a cardiac standpoint. He does admit to chronic shortness of breath that he feels is stable. This does not limit him. He continues to work every day and tolerates this well. Interval History since last office visit:  Today, 3/8/2022, ECG demonstrates paced (64). Device interrogation demonstrated AP 99.8%,  0.2%, 148 PVC per hour, CECILE 3.0 years. He states that he is doing well. He is taking his medications as prescribed. Patient denies current edema, chest pain, sob, palpitations, dizziness or syncope. Past Medical History:   has a past medical history of CAD (coronary artery disease), COPD (chronic obstructive pulmonary disease) ([facility] [address] Hypertension, and Systolic CHF, chronic ([facility] [address]     Past Surgical History:   has a past surgical history that includes other surgical history (1999); Coronary angioplasty with stent (05/21/2013); pacemaker placement; Coronary artery bypass graft (10/14/2015); and hernia repair (08/2019). Allergies:  Patient has no known allergies. Social History:   reports that he has been smoking cigarettes. He has a 10.00 pack-year smoking history. He has never used smokeless tobacco. He reports that he does not drink alcohol and does not use drugs. Family History: family history includes Diabetes in his mother; High Blood Pressure in his father, mother, and sister; [place] / [place] in his mother. Home Medications:    Prior to Admission medications    Medication Sig Start Date End Date Taking? Authorizing Provider   atorvastatin (LIPITOR) 80 MG tablet Take 1 tablet by mouth nightly 2/1/22  Yes Barbara Dorsey MD   clopidogrel (PLAVIX) 75 MG tablet Take 1 tablet by mouth daily 2/1/22  Yes Barbara Dorsey MD   carvedilol (COREG) 25 MG tablet Take 1 tablet by mouth 2 times daily TAKE 1 TABLET BY MOUTH TWO  TIMES DAILY 2/1/22  Yes Barbara Dorsey MD   lisinopril (PRINIVIL;ZESTRIL) 10 MG tablet TAKE 1 TABLET BY MOUTH  DAILY 2/1/22  Yes Barbara Dorsey MD   aspirin 81 MG chewable tablet Take 1 tablet by mouth daily. 5/25/13  Yes JOHN PAUL Merino - CNP       REVIEW OF SYSTEMS:    All 14-point review of systems are completed and  pertinent positives are mentioned in the history of present illness. Other  systems are reviewed and are negative. Physical Examination:    /78   Pulse 59   Ht 5' 8\" (1.727 m)   Wt 171 lb 8 oz (77.8 kg)   SpO2 100%   BMI 26.08 kg/m²      Constitutional and General Appearance:    alert, cooperative, no distress and appears stated age  [de-identified]:    PERRLA, no cervical lymphadenopathy. No masses palpable.  Normal oral  mucosa  Respiratory:  · Normal excursion and expansion without use of accessory muscles  · Resp Auscultation: Normal breath sounds without dullness or wheezing  Cardiovascular:  · The apical impulse is not displaced  · Heart tones are crisp and normal. regular S1 and S2.  · Grade II/VI systolic murmur best LLSB  Abdomen:  · No masses or tenderness  · Bowel sounds present  Extremities:  ·  No Cyanosis or Clubbing  ·  Lower extremity edema: No  · Skin: Warm and dry  Neurological:  · Alert and oriented. · Moves all extremities well  · No abnormalities of mood, affect, memory, mentation, or behavior are noted    DATA:    ECG 3/8/22: Personally reviewed. IMPRESSION:    1. Ventricular tachycardia ([address]    2. PAF (paroxysmal atrial fibrillation) ([address]    3. Dual ICD (implantable cardioverter-defibrillator) in place    4. Sinus node dysfunction (HCC)    5. Cardiac pacemaker in situ      1. Non-sustained ventricular tachycardia/ventricular tachycardia. Patient is a pleasant 59-year-old male with a medical regimen for ventricular tachycardia prior to CABG, ischemic cardiomyopathy status post CABG, paroxysmal atrial fibrillation not on anticoagulation per patient's preference, hypertension, hyperlipidemia, sick sinus syndrome status post dual-chamber ICD who presents from home to [facility] care. She has had no shocks from his device. His continues have some nonsustained ventricular tachycardia on his device but nothing lasting more we discussed potential options including antiarrhythmic therapy and ablation. At this point I see no indication either of these options at this point. We will continue remote monitoring for now. Follow-up with EP NP in 1 year. - Continue GDMT with carvedilol.  - Continue remote monitoring. 3/8/2022  Patient continues to have short NSVT. Asymptomatic.  - Continue GDMT with carvedilol.  - Continue remote monitoring.     2. Sick sinus syndrome status post DC ICD ([employer]). Patient has history of sick sinus syndrome. He is status post dual-chamber ICD.   He is atrially paced approximately 100% of the time and ventricular pacing approximately 1% of the time. Device function appropriate and stable. - Plan as per above. 3/8/2022  Device function appropriate. Atrial pacing nearly 100% of the time. - Plan as per above.     3. Paroxysmal atrial fibrillation ([id number] 4). Patient with history of paroxysmal atrial fibrillation. His [id number] score is 4. Patient not interested in anticoagulation at this point. No atrial fibrillation on most recent check. - Continue coreg. 3/8/2022  Patient continues short (less than 5 minutes of atrial fibrillation) runs of atrial fibrillation.  - Continue coreg.     4. Ischemic cardiomyopathy status post CABG. Stable. Chest pain unchanged and continues to smoke. - Continue to follow up with Dr. Harry Rebolledo. 3/8/2022  - Per NEVILLE/Gianni.     5. COPD.     RECOMMENDATIONS:  1. Continue with remote device checks every 3 months. 2. Continue current medications as prescribed. 3. Follow up in one year, or sooner if needed. QUALITY MEASURES  1. Tobacco Cessation Counseling: Yes  2. Retake of BP if >140/90:   NA  3. Documentation to PCP/referring for new patient:  Sent to PCP at close of office visit  4. CAD patient on anti-platelet: Yes  5. CAD patient on STATIN therapy:  Yes  6. Patient with CHF and aFib on anticoagulation:  No     All questions and concerns were addressed to the patient/family. Alternatives to my treatment were discussed. Dr. Prachi Barrios MD  Electrophysiology  [facility]. [address]. [address]. [facility] [address]  Phone: [phone]  Fax: [phone]     I, Kody Bermudez, RN, am scribing for and in the presence of Dr. Eva Burnett. 03/08/22 11:11 AM   Kody Bermudez RN    The scribe's documentation has been prepared under my direction and personally reviewed by me in its entirety.  I confirm that the note above accurately reflects all work, physical examination, the discussion of treatments and procedures, and medical decision making performed by me. Renata Harkins MD personally performed the services described in this documentation as scribed by nurse in my presence, and is both accurate and complete. Electronically signed by Jeremy Lerner MD on 3/8/2022 at 12:22 PM     NOTE: This report was transcribed using voice recognition software. Every effort was made to ensure accuracy, however, inadvertent computerized transcription errors may be present.

## 2022-04-04 ENCOUNTER — NURSE ONLY (OUTPATIENT)
Dept: CARDIOLOGY CLINIC | Age: 67
End: 2022-04-04
Payer: COMMERCIAL

## 2022-04-04 DIAGNOSIS — I50.22 SYSTOLIC CHF, CHRONIC (HCC): ICD-10-CM

## 2022-04-04 DIAGNOSIS — I25.5 ISCHEMIC CARDIOMYOPATHY: ICD-10-CM

## 2022-04-04 DIAGNOSIS — I47.20 VENTRICULAR TACHYCARDIA: ICD-10-CM

## 2022-04-04 DIAGNOSIS — I49.5 SINUS NODE DYSFUNCTION (HCC): ICD-10-CM

## 2022-04-04 DIAGNOSIS — Z95.810 ICD (IMPLANTABLE CARDIOVERTER-DEFIBRILLATOR) IN PLACE: ICD-10-CM

## 2022-04-04 NOTE — PROGRESS NOTES
Remote transmission received for patients dual chamber ICD. Transmission shows normal sensing and pacing function. EP physician will review. See interrogation under the cardiology tab in the [address] [place] [address] [address] field for more details. Will continue to monitor remotely. Hx PAF/NSVT (ASA, Plavix, Coreg). Hx FFOS/artifact on atrial channel. Episodes Since: 08-Mar-2022  2 Non-sustained VT. optivol . Thoracic impedance trend stable. 1 Monitored AT/AF on 07-Oct-2021 @ 0921. egms show artifact on atrial lead (st steve tendril lead from 5-24-13.) lead trends stable.    Hx -3/2019-ELECTRIC AGAR MACHINE-CHRIS SOURCE-NOISE REVERSION

## 2022-04-06 PROCEDURE — 93296 REM INTERROG EVL PM/IDS: CPT | Performed by: INTERNAL MEDICINE

## 2022-04-06 PROCEDURE — 93295 DEV INTERROG REMOTE 1/2/MLT: CPT | Performed by: INTERNAL MEDICINE

## 2022-04-06 PROCEDURE — 93297 REM INTERROG DEV EVAL ICPMS: CPT | Performed by: INTERNAL MEDICINE

## 2022-07-05 NOTE — PROGRESS NOTES
End of 91-day monitoring period 7/7/22. Remote transmission received for patients dual chamber ICD. Transmission shows normal sensing and pacing function. EP physician will review. See interrogation under the cardiology tab in the [address] [place] [address] [address] field for more details. Will continue to monitor remotely. Hx PAF/NSVT (ASA, Plavix, Coreg). Hx FFOS/artifact on atrial channel. Episodes Since: 04-Apr-2022  10 Non-sustained VT, longest 6 sec. Thoracic impedance trend stable. 1 Monitored AT/AF on 07-Oct-2021 @ 0921. egms show artifact on atrial lead (st steve tendril lead from 5-24-13.) lead trends stable.    Hx -3/2019-ELECTRIC [employer] MACHINE-CHRIS SOURCE-NOISE REVERSION

## 2022-07-07 ENCOUNTER — NURSE ONLY (OUTPATIENT)
Dept: CARDIOLOGY CLINIC | Age: 67
End: 2022-07-07
Payer: COMMERCIAL

## 2022-07-07 DIAGNOSIS — Z95.810 ICD (IMPLANTABLE CARDIOVERTER-DEFIBRILLATOR) IN PLACE: ICD-10-CM

## 2022-07-07 DIAGNOSIS — I49.5 SINUS NODE DYSFUNCTION (HCC): ICD-10-CM

## 2022-07-07 DIAGNOSIS — I25.5 ISCHEMIC CARDIOMYOPATHY: ICD-10-CM

## 2022-07-07 DIAGNOSIS — I50.22 SYSTOLIC CHF, CHRONIC (HCC): Primary | ICD-10-CM

## 2022-07-07 PROCEDURE — 93297 REM INTERROG DEV EVAL ICPMS: CPT | Performed by: INTERNAL MEDICINE

## 2022-07-07 PROCEDURE — 93295 DEV INTERROG REMOTE 1/2/MLT: CPT | Performed by: INTERNAL MEDICINE

## 2022-07-07 PROCEDURE — 93296 REM INTERROG EVL PM/IDS: CPT | Performed by: INTERNAL MEDICINE

## 2022-10-10 ENCOUNTER — NURSE ONLY (OUTPATIENT)
Dept: CARDIOLOGY CLINIC | Age: 67
End: 2022-10-10
Payer: COMMERCIAL

## 2022-10-10 DIAGNOSIS — Z95.810 ICD (IMPLANTABLE CARDIOVERTER-DEFIBRILLATOR) IN PLACE: ICD-10-CM

## 2022-10-10 DIAGNOSIS — I25.5 ISCHEMIC CARDIOMYOPATHY: ICD-10-CM

## 2022-10-10 DIAGNOSIS — I49.5 SINUS NODE DYSFUNCTION (HCC): ICD-10-CM

## 2022-10-10 DIAGNOSIS — I50.22 SYSTOLIC CHF, CHRONIC (HCC): Primary | ICD-10-CM

## 2022-10-10 DIAGNOSIS — I47.20 VENTRICULAR TACHYCARDIA: ICD-10-CM

## 2022-10-10 PROCEDURE — 93296 REM INTERROG EVL PM/IDS: CPT | Performed by: INTERNAL MEDICINE

## 2022-10-10 PROCEDURE — 93295 DEV INTERROG REMOTE 1/2/MLT: CPT | Performed by: INTERNAL MEDICINE

## 2022-10-10 PROCEDURE — 93297 REM INTERROG DEV EVAL ICPMS: CPT | Performed by: INTERNAL MEDICINE

## 2022-10-13 NOTE — PROGRESS NOTES
Remote transmission received for patients dual chamber ICD. Transmission shows normal sensing and pacing function. EP physician will review. See interrogation under the cardiology tab in the [address] [place] [address] [address] field for more details. Will continue to monitor remotely. Hx PAF/NSVT (ASA, Plavix, Coreg). Episodes Since: 04-Oct-2021  8 Non-sustained VT.  1 Monitored AT/AF on 07-Oct-2021 @ 0921. egms show artifact on atrial lead (st steve tendril lead from 5-24-13.) lead trends stable. Pacing (% of Time Since 04-Oct-2021)  Total  0.3% (MVP On)  AP 99.9%. Thoracic impedance trend stable.     Hx -3/2019-ELECTRIC "[employer] MACHINE-CHRIS SOURCE-NOISE REVERSION Consent (Lip)/Introductory Paragraph: The rationale for Mohs was explained to the patient and consent was obtained. The risks, benefits and alternatives to therapy were discussed in detail. Specifically, the risks of lip deformity, changes in the oral aperture, infection, scarring, bleeding, prolonged wound healing, incomplete removal, allergy to anesthesia, nerve injury and recurrence were addressed. Prior to the procedure, the treatment site was clearly identified and confirmed by the patient. All components of Universal Protocol/PAUSE Rule completed.

## 2022-10-14 NOTE — PROGRESS NOTES
End of 91-day monitoring period 10/10. Episodes Since: 04-Jul-2022  14 Non-sustained VT-brief NSVT/PAT noted-all consistently sensed. Thoracic impedance trend stable. Remote transmission received for patients dual chamber ICD. Transmission shows normal sensing and pacing function. EP physician will review. See interrogation under the cardiology tab in the [address] [place] [address] [address] field for more details. Will continue to monitor remotely. Hx PAF/NSVT (ASA, Plavix, Coreg). Hx FFOS/artifact on atrial channel. 1 Monitored AT/AF on 07-Oct-2021 @ 0921. egms show artifact on atrial lead (st steve tendril lead from 5-24-13.) lead trends stable.    Hx -3/2019-ELECTRIC [employer] MACHINE-CHRIS SOURCE-NOISE REVERSION

## 2022-11-04 ENCOUNTER — TELEPHONE (OUTPATIENT)
Dept: CARDIOLOGY CLINIC | Age: 67
End: 2022-11-04

## 2022-11-04 NOTE — LETTER
[address] [place] [address] Cardiology - [address]  Phone: [phone]  Fax: [phone]    Lalo Crespo MD        November 4, 2022    Mal Galicia 1955 is iIntermediate risk clinically for low risk procedure. Proceed as planned. OK to hold plavix for least amount of time possible. If you have any questions or concerns, please don't hesitate to call.     Sincerely,        Lalo Crespo MD

## 2022-11-04 NOTE — TELEPHONE ENCOUNTER
Intermediate risk clinically for low risk procedure. Proceed as planned. OK to hold plavix for least amount of time possible.

## 2022-11-04 NOTE — TELEPHONE ENCOUNTER
Received fax from [facility] requesting cardiac clearance. Pt is having EGD on 11/09/2022. Pt is currently taking plavix and needs to hold for 5 days.      Last OV 02/01/2022  EKG 03/2022

## 2023-01-09 ENCOUNTER — NURSE ONLY (OUTPATIENT)
Dept: CARDIOLOGY CLINIC | Age: 68
End: 2023-01-09

## 2023-01-09 DIAGNOSIS — I25.5 ISCHEMIC CARDIOMYOPATHY: Primary | ICD-10-CM

## 2023-01-09 DIAGNOSIS — I47.20 VENTRICULAR TACHYCARDIA: ICD-10-CM

## 2023-01-09 DIAGNOSIS — I49.5 SINUS NODE DYSFUNCTION (HCC): ICD-10-CM

## 2023-01-09 DIAGNOSIS — I50.22 SYSTOLIC CHF, CHRONIC (HCC): ICD-10-CM

## 2023-01-09 DIAGNOSIS — Z95.810 DUAL ICD (IMPLANTABLE CARDIOVERTER-DEFIBRILLATOR) IN PLACE: ICD-10-CM

## 2023-01-20 ENCOUNTER — TELEPHONE (OUTPATIENT)
Dept: CARDIOLOGY CLINIC | Age: 68
End: 2023-01-20

## 2023-01-20 DIAGNOSIS — I25.110 CORONARY ARTERY DISEASE INVOLVING NATIVE CORONARY ARTERY OF NATIVE HEART WITH UNSTABLE ANGINA PECTORIS (HCC): ICD-10-CM

## 2023-01-20 DIAGNOSIS — I50.22 SYSTOLIC CHF, CHRONIC (HCC): Primary | ICD-10-CM

## 2023-01-20 DIAGNOSIS — I25.5 ISCHEMIC CARDIOMYOPATHY: ICD-10-CM

## 2023-01-20 DIAGNOSIS — I10 ESSENTIAL HYPERTENSION: ICD-10-CM

## 2023-01-20 DIAGNOSIS — Z79.899 MEDICATION MANAGEMENT: ICD-10-CM

## 2023-01-20 NOTE — PROGRESS NOTES
End of 91-day monitoring period 1/9. Time in AT/AF <0.1 hr/day (<0.1%)-AF EGM illustrates artifact on atrial lead. .(Atip-Aring). X 40 sec. Monitored AT/AF on 07-Oct-2021 @ 0921. egms show artifact on atrial lead (st steve tendril lead from 5-24-13.) lead trends stable. Hx -3/2019-ELECTRIC AGAR MACHINE-CHRIS SOURCE-NOISE REVERSION  Thoracic impedance trend stable. Episodes Since: 03-Oct-2022  13 Non-sustained VT-some inconsistently sensed. Remote transmission received for patients dual chamber ICD. Transmission shows normal sensing and pacing function. EP physician will review. See interrogation under the cardiology tab in the [address] [place] [address] [address] field for more details. Will continue to monitor remotely. Hx PAF/NSVT (ASA, Plavix, Coreg). Hx FFOS/artifact on atrial channel.

## 2023-02-22 DIAGNOSIS — I48.0 PAF (PAROXYSMAL ATRIAL FIBRILLATION) (HCC): ICD-10-CM

## 2023-03-01 RX ORDER — CARVEDILOL 25 MG/1
TABLET ORAL
Qty: 180 TABLET | Refills: 3 | Status: SHIPPED | OUTPATIENT
Start: 2023-03-01

## 2023-03-01 RX ORDER — LISINOPRIL 10 MG/1
TABLET ORAL
Qty: 90 TABLET | Refills: 3 | Status: SHIPPED | OUTPATIENT
Start: 2023-03-01

## 2023-03-01 RX ORDER — ATORVASTATIN CALCIUM 80 MG/1
TABLET, FILM COATED ORAL
Qty: 90 TABLET | Refills: 3 | Status: SHIPPED | OUTPATIENT
Start: 2023-03-01

## 2023-03-01 RX ORDER — CLOPIDOGREL BISULFATE 75 MG/1
75 TABLET ORAL DAILY
Qty: 90 TABLET | Refills: 3 | Status: SHIPPED | OUTPATIENT
Start: 2023-03-01

## 2023-03-23 ENCOUNTER — HOSPITAL ENCOUNTER (OUTPATIENT)
Age: 68
Setting detail: SPECIMEN
Discharge: HOME | End: 2023-03-23
Payer: COMMERCIAL

## 2023-03-23 DIAGNOSIS — Z79.899 MEDICATION MANAGEMENT: ICD-10-CM

## 2023-03-23 DIAGNOSIS — I25.5 ISCHEMIC CARDIOMYOPATHY: ICD-10-CM

## 2023-03-23 DIAGNOSIS — I50.22 SYSTOLIC CHF, CHRONIC (HCC): ICD-10-CM

## 2023-03-23 DIAGNOSIS — I25.110 CORONARY ARTERY DISEASE INVOLVING NATIVE CORONARY ARTERY OF NATIVE HEART WITH UNSTABLE ANGINA PECTORIS (HCC): ICD-10-CM

## 2023-03-23 DIAGNOSIS — I10 ESSENTIAL HYPERTENSION: ICD-10-CM

## 2023-03-23 LAB
ALBUMIN SERPL-MCNC: 4.4 G/DL (ref 3.4–5)
ALBUMIN/GLOB SERPL: 2 {RATIO} (ref 1.1–2.2)
ALP SERPL-CCNC: 74 U/L (ref 40–129)
ALT SERPL-CCNC: 20 U/L (ref 10–40)
ANION GAP SERPL CALCULATED.3IONS-SCNC: 10 MMOL/L (ref 3–16)
AST SERPL-CCNC: 21 U/L (ref 15–37)
BASOPHILS # BLD: 0.1 K/UL (ref 0–0.2)
BASOPHILS NFR BLD: 1 %
BILIRUB SERPL-MCNC: 0.5 MG/DL (ref 0–1)
BUN SERPL-MCNC: 12 MG/DL (ref 7–20)
CALCIUM SERPL-MCNC: 9.5 MG/DL (ref 8.3–10.6)
CHLORIDE SERPL-SCNC: 102 MMOL/L (ref 99–110)
CHOLEST SERPL-MCNC: 116 MG/DL (ref 0–199)
CO2 SERPL-SCNC: 28 MMOL/L (ref 21–32)
CREAT SERPL-MCNC: 1.1 MG/DL (ref 0.8–1.3)
DEPRECATED RDW RBC AUTO: 13.9 % (ref 12.4–15.4)
EOSINOPHIL # BLD: 0.1 K/UL (ref 0–0.6)
EOSINOPHIL NFR BLD: 2 %
GFR SERPLBLD CREATININE-BSD FMLA CKD-EPI: >60 ML/MIN/{1.73_M2}
GLUCOSE SERPL-MCNC: 120 MG/DL (ref 70–99)
HCT VFR BLD AUTO: 42.7 % (ref 40.5–52.5)
HDLC SERPL-MCNC: 29 MG/DL (ref 40–60)
HGB BLD-MCNC: 14.4 G/DL (ref 13.5–17.5)
LDLC SERPL CALC-MCNC: 61 MG/DL
LYMPHOCYTES # BLD: 1.8 K/UL (ref 1–5.1)
LYMPHOCYTES NFR BLD: 28 %
MCH RBC QN AUTO: 29.8 PG (ref 26–34)
MCHC RBC AUTO-ENTMCNC: 33.8 G/DL (ref 31–36)
MCV RBC AUTO: 88.4 FL (ref 80–100)
MONOCYTES # BLD: 0.6 K/UL (ref 0–1.3)
MONOCYTES NFR BLD: 11 %
NEUTROPHILS # BLD: 3.3 K/UL (ref 1.7–7.7)
NEUTROPHILS NFR BLD: 53 %
NEUTS BAND NFR BLD MANUAL: 3 % (ref 0–7)
PLATELET # BLD AUTO: 146 K/UL (ref 135–450)
PLATELET BLD QL SMEAR: ADEQUATE
PMV BLD AUTO: 11.3 FL (ref 5–10.5)
POTASSIUM SERPL-SCNC: 4.9 MMOL/L (ref 3.5–5.1)
PROT SERPL-MCNC: 6.6 G/DL (ref 6.4–8.2)
RBC # BLD AUTO: 4.84 M/UL (ref 4.2–5.9)
SLIDE REVIEW: ABNORMAL
SODIUM SERPL-SCNC: 140 MMOL/L (ref 136–145)
TRIGL SERPL-MCNC: 129 MG/DL (ref 0–150)
TSH SERPL DL<=0.005 MIU/L-ACNC: 1 UIU/ML (ref 0.27–4.2)
VARIANT LYMPHS NFR BLD MANUAL: 2 % (ref 0–6)
VLDLC SERPL CALC-MCNC: 26 MG/DL
WBC # BLD AUTO: 5.9 K/UL (ref 4–11)

## 2023-03-23 PROCEDURE — 84443 ASSAY THYROID STIM HORMONE: CPT

## 2023-03-23 PROCEDURE — 85025 COMPLETE CBC W/AUTO DIFF WBC: CPT

## 2023-03-23 PROCEDURE — 80061 LIPID PANEL: CPT

## 2023-03-23 PROCEDURE — 36415 COLL VENOUS BLD VENIPUNCTURE: CPT

## 2023-03-23 PROCEDURE — 80053 COMPREHEN METABOLIC PANEL: CPT

## 2023-03-24 ENCOUNTER — TELEPHONE (OUTPATIENT)
Dept: CARDIOLOGY CLINIC | Age: 68
End: 2023-03-24

## 2023-03-24 NOTE — TELEPHONE ENCOUNTER
----- Message from Maye Hendrix MD sent at 3/23/2023  6:01 PM EDT -----  Good labs except for chronically lower HDL (good chol). We do not treat as no clinical benefit.  cpm

## 2023-06-08 ENCOUNTER — OFFICE VISIT (OUTPATIENT)
Dept: CARDIOLOGY CLINIC | Age: 68
End: 2023-06-08
Payer: COMMERCIAL

## 2023-06-08 VITALS
OXYGEN SATURATION: 100 % | SYSTOLIC BLOOD PRESSURE: 138 MMHG | BODY MASS INDEX: 25.01 KG/M2 | RESPIRATION RATE: 16 BRPM | DIASTOLIC BLOOD PRESSURE: 72 MMHG | HEART RATE: 62 BPM | HEIGHT: 68 IN | WEIGHT: 165 LBS

## 2023-06-08 DIAGNOSIS — Z87.891 HISTORY OF TOBACCO ABUSE: ICD-10-CM

## 2023-06-08 DIAGNOSIS — I25.10 CORONARY ARTERY DISEASE INVOLVING NATIVE CORONARY ARTERY OF NATIVE HEART WITHOUT ANGINA PECTORIS: Primary | ICD-10-CM

## 2023-06-08 PROCEDURE — G8417 CALC BMI ABV UP PARAM F/U: HCPCS | Performed by: INTERNAL MEDICINE

## 2023-06-08 PROCEDURE — G8427 DOCREV CUR MEDS BY ELIG CLIN: HCPCS | Performed by: INTERNAL MEDICINE

## 2023-06-08 PROCEDURE — 3017F COLORECTAL CA SCREEN DOC REV: CPT | Performed by: INTERNAL MEDICINE

## 2023-06-08 PROCEDURE — 3078F DIAST BP <80 MM HG: CPT | Performed by: INTERNAL MEDICINE

## 2023-06-08 PROCEDURE — 4004F PT TOBACCO SCREEN RCVD TLK: CPT | Performed by: INTERNAL MEDICINE

## 2023-06-08 PROCEDURE — 1123F ACP DISCUSS/DSCN MKR DOCD: CPT | Performed by: INTERNAL MEDICINE

## 2023-06-08 PROCEDURE — 3075F SYST BP GE 130 - 139MM HG: CPT | Performed by: INTERNAL MEDICINE

## 2023-06-08 PROCEDURE — 99214 OFFICE O/P EST MOD 30 MIN: CPT | Performed by: INTERNAL MEDICINE

## 2023-06-08 NOTE — PROGRESS NOTES
he has had hand surgery, esophagus stretched, and colonoscopy. Patient denies current edema, chest pain, sob, palpitations, dizziness or syncope. Patient is taking all cardiac medications as prescribed and tolerates them well. He still declines NOAC or coumadin and understands stroke risk. Does not want cardiac testing unless absolutely necessary. He is still smoking 1/4 PPD. He received both Moderna COVID vaccines. Past Medical History:     has a past medical history of CAD (coronary artery disease), COPD (chronic obstructive pulmonary disease) ([facility] [address] Hypertension, and Systolic CHF, chronic ([facility] [address] Surgical History:   has a past surgical history that includes other surgical history (1999); Coronary angioplasty with stent (05/21/2013); pacemaker placement; Coronary artery bypass graft (10/14/2015); and hernia repair (08/2019). Social History:   He is  and lives at [facility] with his wife. He is retired. He was a self employed . Smoker. He reports that he drinks about 2-3 beers a few days a week. He reports that he does not use illicit drugs. Family History:  family history includes Diabetes in his mother; High Blood Pressure in his father, mother, and sister; Melody Kristopher / [place] in his mother. Home Medications:  Prior to Admission medications    Medication Sig Start Date End Date Taking? Authorizing Provider   aspirin 81 MG chewable tablet Take 1 tablet by mouth daily. 5/25/13  Yes Nicole Jones CNP   clopidogrel (PLAVIX) 75 MG tablet Take 1 tablet by mouth daily. 5/25/13  Yes Brittany Moody CNP   lisinopril (PRINIVIL;ZESTRIL) 5 MG tablet Take 1 tablet by mouth daily. 5/25/13  Yes Nicole Jones CNP   metoprolol (LOPRESSOR) 25 MG tablet Take 1 tablet by mouth 2 times daily. 5/25/13  Yes Brittany Moody CNP   simvastatin (ZOCOR) 20 MG tablet Take 1 tablet by mouth nightly.  5/25/13  Yes Nicole Jones CNP        Allergies:  Patient has no known

## 2023-06-08 NOTE — PATIENT INSTRUCTIONS
Plan:  Current medications reviewed. No changes at this time. Refills given as warranted. Lab work due before next office visit 2024. You must be fasting. I Strongly advised complete smoking cessation. Resources given to assist quitting including the followin-800-QUIT NOW. Follow up with me in 1 year 2024. Call in December to make follow up.

## 2023-07-10 ENCOUNTER — NURSE ONLY (OUTPATIENT)
Dept: CARDIOLOGY CLINIC | Age: 68
End: 2023-07-10
Payer: COMMERCIAL

## 2023-07-10 DIAGNOSIS — I50.22 SYSTOLIC CHF, CHRONIC (HCC): ICD-10-CM

## 2023-07-10 DIAGNOSIS — Z95.810 DUAL ICD (IMPLANTABLE CARDIOVERTER-DEFIBRILLATOR) IN PLACE: ICD-10-CM

## 2023-07-10 DIAGNOSIS — I49.5 SINUS NODE DYSFUNCTION (HCC): ICD-10-CM

## 2023-07-10 DIAGNOSIS — I47.20 VENTRICULAR TACHYCARDIA (HCC): ICD-10-CM

## 2023-07-10 DIAGNOSIS — I25.5 ISCHEMIC CARDIOMYOPATHY: Primary | ICD-10-CM

## 2023-07-10 PROCEDURE — 93295 DEV INTERROG REMOTE 1/2/MLT: CPT | Performed by: INTERNAL MEDICINE

## 2023-07-10 PROCEDURE — 93296 REM INTERROG EVL PM/IDS: CPT | Performed by: INTERNAL MEDICINE

## 2023-07-10 PROCEDURE — 93297 REM INTERROG DEV EVAL ICPMS: CPT | Performed by: INTERNAL MEDICINE

## 2023-09-14 NOTE — LETTER
[address] [facility] [phone]  [address] [phone]    Pacemaker/Defibrillator Clinic          01/31/18        Maria Luisa Castillo  [address] [place] [id number]        Dear Maria Luisa Castillo    This letter is to inform you that we received the transmission from your monitor at home that checks your pacemaker and/or defibrillator, or implanted heart monitor. The next date your monitor will automatically transmit will be 4/24/18. Please do not send additional routine transmissions unless specifically requested. Your device and monitor are wireless and most transmit cellularly, but please periodically check your monitor is still plugged in to the electrical outlet. If you still use the telephone land line to send please ensure the connection to the phone noa is secure. This will help to ensure successful automatic transmissions in the future. Also, the monitor needs to be close to you while sleeping at night. Please be aware that the remote device transmission sites are periodically monitored only during regular business hours during which simultaneous in-office device clinics are being run. If your transmission requires attention, we will contact you as soon as possible. Thank you.             [facility] Please see the attached refill request.

## 2023-10-05 PROCEDURE — G2066 INTER DEVC REMOTE 30D: HCPCS | Performed by: NURSE PRACTITIONER

## 2023-10-05 PROCEDURE — 93297 REM INTERROG DEV EVAL ICPMS: CPT | Performed by: NURSE PRACTITIONER

## 2023-10-19 PROCEDURE — 93296 REM INTERROG EVL PM/IDS: CPT | Performed by: INTERNAL MEDICINE

## 2023-10-19 PROCEDURE — 93295 DEV INTERROG REMOTE 1/2/MLT: CPT | Performed by: INTERNAL MEDICINE

## 2023-11-05 PROCEDURE — 93297 REM INTERROG DEV EVAL ICPMS: CPT | Performed by: NURSE PRACTITIONER

## 2023-11-05 PROCEDURE — G2066 INTER DEVC REMOTE 30D: HCPCS | Performed by: NURSE PRACTITIONER

## 2023-11-27 DIAGNOSIS — I48.0 PAF (PAROXYSMAL ATRIAL FIBRILLATION) (HCC): ICD-10-CM

## 2023-11-28 RX ORDER — LISINOPRIL 10 MG/1
TABLET ORAL
Qty: 100 TABLET | Refills: 2 | OUTPATIENT
Start: 2023-11-28

## 2023-11-28 RX ORDER — ATORVASTATIN CALCIUM 80 MG/1
TABLET, FILM COATED ORAL
Qty: 100 TABLET | Refills: 2 | OUTPATIENT
Start: 2023-11-28

## 2023-11-28 RX ORDER — CARVEDILOL 25 MG/1
TABLET ORAL
Qty: 200 TABLET | Refills: 2 | OUTPATIENT
Start: 2023-11-28

## 2023-11-28 RX ORDER — CLOPIDOGREL BISULFATE 75 MG/1
75 TABLET ORAL DAILY
Qty: 100 TABLET | Refills: 2 | OUTPATIENT
Start: 2023-11-28

## 2023-11-29 ENCOUNTER — TELEPHONE (OUTPATIENT)
Dept: CARDIOLOGY CLINIC | Age: 68
End: 2023-11-29

## 2023-11-29 NOTE — TELEPHONE ENCOUNTER
Good, continue current treatment plan. No changes. Recommend following a low sodium diet to prevent any further fluid retention.    Thank you, Steven Marshall

## 2023-11-29 NOTE — TELEPHONE ENCOUNTER
Called and spoke with patient. Patient reports no change in weight 170 lbs reported. He typically fluctuates between 168-172 lbs per patient at home. He reports SOB this is chronic and unchanged in nature. Denies any swelling.

## 2023-11-29 NOTE — TELEPHONE ENCOUNTER
Clara Ricoi shows decreasing thoracic impedence consistent with increasing fluid index. Please contact patient and find out if he is having increased shortness of breath, swelling or weight gain.      Thank you, Caron Lim

## 2023-12-06 PROCEDURE — G2066 INTER DEVC REMOTE 30D: HCPCS | Performed by: NURSE PRACTITIONER

## 2023-12-06 PROCEDURE — 93297 REM INTERROG DEV EVAL ICPMS: CPT | Performed by: NURSE PRACTITIONER

## 2024-01-06 PROCEDURE — 93297 REM INTERROG DEV EVAL ICPMS: CPT | Performed by: NURSE PRACTITIONER

## 2024-01-12 ENCOUNTER — TELEPHONE (OUTPATIENT)
Dept: CARDIOLOGY CLINIC | Age: 69
End: 2024-01-12

## 2024-01-12 NOTE — TELEPHONE ENCOUNTER
Pts wife stated that pts pcp use to fill Viagra and that pcp has retired. They want to know if smm is ok with prescibing? If so preferred pharmacy is [facility] in [place] [phone]

## 2024-01-18 PROCEDURE — 93296 REM INTERROG EVL PM/IDS: CPT | Performed by: INTERNAL MEDICINE

## 2024-01-18 PROCEDURE — 93295 DEV INTERROG REMOTE 1/2/MLT: CPT | Performed by: INTERNAL MEDICINE

## 2024-01-23 DIAGNOSIS — I48.0 PAF (PAROXYSMAL ATRIAL FIBRILLATION) (HCC): ICD-10-CM

## 2024-01-25 RX ORDER — LISINOPRIL 10 MG/1
TABLET ORAL
Qty: 90 TABLET | Refills: 1 | Status: SHIPPED | OUTPATIENT
Start: 2024-01-25

## 2024-01-25 RX ORDER — ATORVASTATIN CALCIUM 80 MG/1
TABLET, FILM COATED ORAL
Qty: 90 TABLET | Refills: 1 | Status: SHIPPED | OUTPATIENT
Start: 2024-01-25

## 2024-01-25 RX ORDER — CLOPIDOGREL BISULFATE 75 MG/1
75 TABLET ORAL DAILY
Qty: 90 TABLET | Refills: 1 | Status: SHIPPED | OUTPATIENT
Start: 2024-01-25

## 2024-01-25 RX ORDER — CARVEDILOL 25 MG/1
TABLET ORAL
Qty: 180 TABLET | Refills: 1 | Status: SHIPPED | OUTPATIENT
Start: 2024-01-25

## 2024-02-06 PROCEDURE — 93297 REM INTERROG DEV EVAL ICPMS: CPT | Performed by: NURSE PRACTITIONER

## 2024-03-08 PROCEDURE — 93297 REM INTERROG DEV EVAL ICPMS: CPT | Performed by: NURSE PRACTITIONER

## 2024-04-12 DIAGNOSIS — I48.0 PAF (PAROXYSMAL ATRIAL FIBRILLATION) (HCC): ICD-10-CM

## 2024-04-15 RX ORDER — ATORVASTATIN CALCIUM 80 MG/1
TABLET, FILM COATED ORAL
Qty: 90 TABLET | Refills: 0 | Status: SHIPPED | OUTPATIENT
Start: 2024-04-15

## 2024-04-15 RX ORDER — LISINOPRIL 10 MG/1
TABLET ORAL
Qty: 90 TABLET | Refills: 0 | Status: SHIPPED | OUTPATIENT
Start: 2024-04-15

## 2024-04-15 RX ORDER — CLOPIDOGREL BISULFATE 75 MG/1
75 TABLET ORAL DAILY
Qty: 90 TABLET | Refills: 0 | Status: SHIPPED | OUTPATIENT
Start: 2024-04-15

## 2024-04-15 RX ORDER — CARVEDILOL 25 MG/1
TABLET ORAL
Qty: 180 TABLET | Refills: 0 | Status: SHIPPED | OUTPATIENT
Start: 2024-04-15

## 2024-05-30 ENCOUNTER — HOSPITAL ENCOUNTER (OUTPATIENT)
Age: 69
Discharge: HOME | End: 2024-05-30
Payer: COMMERCIAL

## 2024-05-30 DIAGNOSIS — I25.10 CORONARY ARTERY DISEASE INVOLVING NATIVE CORONARY ARTERY OF NATIVE HEART WITHOUT ANGINA PECTORIS: ICD-10-CM

## 2024-05-30 LAB
ALBUMIN SERPL-MCNC: 3.8 G/DL (ref 3.4–5)
ALBUMIN/GLOB SERPL: 1.1 {RATIO} (ref 1.1–2.2)
ALP SERPL-CCNC: 103 U/L (ref 40–129)
ALT SERPL-CCNC: 14 U/L (ref 10–40)
ANION GAP SERPL CALCULATED.3IONS-SCNC: 7 MMOL/L (ref 3–16)
AST SERPL-CCNC: 24 U/L (ref 15–37)
BASOPHILS # BLD: 0 K/UL (ref 0–0.2)
BASOPHILS NFR BLD: 1 %
BILIRUB SERPL-MCNC: 0.4 MG/DL (ref 0–1)
BUN SERPL-MCNC: 10 MG/DL (ref 7–20)
CALCIUM SERPL-MCNC: 9.3 MG/DL (ref 8.3–10.6)
CHLORIDE SERPL-SCNC: 105 MMOL/L (ref 99–110)
CHOLEST SERPL-MCNC: 98 MG/DL (ref 0–199)
CO2 SERPL-SCNC: 27 MMOL/L (ref 21–32)
CREAT SERPL-MCNC: 0.8 MG/DL (ref 0.8–1.3)
DEPRECATED RDW RBC AUTO: 14.3 % (ref 12.4–15.4)
EOSINOPHIL # BLD: 0.1 K/UL (ref 0–0.6)
EOSINOPHIL NFR BLD: 2 %
GFR SERPLBLD CREATININE-BSD FMLA CKD-EPI: >90 ML/MIN/{1.73_M2}
GLUCOSE SERPL-MCNC: 104 MG/DL (ref 70–99)
HCT VFR BLD AUTO: 39.7 % (ref 40.5–52.5)
HDLC SERPL-MCNC: 22 MG/DL (ref 40–60)
HGB BLD-MCNC: 13.6 G/DL (ref 13.5–17.5)
LDL CHOLESTEROL: 49 MG/DL
LYMPHOCYTES # BLD: 1.9 K/UL (ref 1–5.1)
LYMPHOCYTES NFR BLD: 29 %
MCH RBC QN AUTO: 30.1 PG (ref 26–34)
MCHC RBC AUTO-ENTMCNC: 34.1 G/DL (ref 31–36)
MCV RBC AUTO: 88.1 FL (ref 80–100)
MONOCYTES # BLD: 0.5 K/UL (ref 0–1.3)
MONOCYTES NFR BLD: 10 %
NEUTROPHILS # BLD: 2.4 K/UL (ref 1.7–7.7)
NEUTROPHILS NFR BLD: 48 %
NEUTS BAND NFR BLD MANUAL: 1 % (ref 0–7)
PLATELET # BLD AUTO: 143 K/UL (ref 135–450)
PLATELET BLD QL SMEAR: ADEQUATE
PMV BLD AUTO: 11.3 FL (ref 5–10.5)
POTASSIUM SERPL-SCNC: 5.4 MMOL/L (ref 3.5–5.1)
PROT SERPL-MCNC: 7.2 G/DL (ref 6.4–8.2)
RBC # BLD AUTO: 4.51 M/UL (ref 4.2–5.9)
RBC MORPH BLD: NORMAL
SLIDE REVIEW: ABNORMAL
SODIUM SERPL-SCNC: 139 MMOL/L (ref 136–145)
TRIGL SERPL-MCNC: 137 MG/DL (ref 0–150)
TSH SERPL DL<=0.005 MIU/L-ACNC: 1.56 UIU/ML (ref 0.27–4.2)
VARIANT LYMPHS NFR BLD MANUAL: 9 % (ref 0–6)
VLDLC SERPL CALC-MCNC: 27 MG/DL
WBC # BLD AUTO: 4.9 K/UL (ref 4–11)

## 2024-05-30 PROCEDURE — 84443 ASSAY THYROID STIM HORMONE: CPT

## 2024-05-30 PROCEDURE — 36415 COLL VENOUS BLD VENIPUNCTURE: CPT

## 2024-05-30 PROCEDURE — 80061 LIPID PANEL: CPT

## 2024-05-30 PROCEDURE — 80053 COMPREHEN METABOLIC PANEL: CPT

## 2024-05-30 PROCEDURE — 85025 COMPLETE CBC W/AUTO DIFF WBC: CPT

## 2024-05-31 ENCOUNTER — TELEPHONE (OUTPATIENT)
Dept: CARDIOLOGY CLINIC | Age: 69
End: 2024-05-31

## 2024-05-31 DIAGNOSIS — Z79.899 MEDICATION MANAGEMENT: Primary | ICD-10-CM

## 2024-05-31 NOTE — TELEPHONE ENCOUNTER
----- Message from Charbel Archer MD sent at 5/30/2024  5:29 PM EDT -----  Labs good except for chronically low HDL (good cholesterol) which we do not treat and high K+ 5.4. Possible spurious lab value. Is he taking any K+ supplements? Eating lots of bananas, OJ, green/leafy veggies? Let's repeat BMP to see if K+ still elevated before I make any recs. If still elevated I will have to adjust lisinopril which can increase K+. Thanks.

## 2024-06-04 NOTE — TELEPHONE ENCOUNTER
Attempted to call pt, no answer. Unable to leave message on VM. This was the third attempt made to contact the pt with no success. Creating letter to mail to pt's home. BMP order placed in chart and mailed paper order to pt.

## 2024-06-04 NOTE — PROGRESS NOTES
mood, affect, memory, mentation, or behavior are noted    Lab Results   Component Value Date    CHOL 116 03/23/2023    CHOL 118 01/31/2022    CHOL 109 07/28/2020     Lab Results   Component Value Date    TRIG 129 03/23/2023    TRIG 92 01/31/2022    TRIG 167 (H) 07/28/2020     Lab Results   Component Value Date    HDL 22 (L) 05/30/2024    HDL 29 (L) 03/23/2023    HDL 34 (L) 01/31/2022     No components found for: \"LDLCALC\", \"LDLCHOLESTEROL\"    Lab Results   Component Value Date    VLDL 27 05/30/2024    VLDL 26 03/23/2023    VLDL 18 01/31/2022     No results found for: \"CHOLHDLRATIO\"  Lab Results   Component Value Date    CREATININE 0.8 05/30/2024    BUN 10 05/30/2024     05/30/2024    K 5.4 (H) 05/30/2024     05/30/2024    CO2 27 05/30/2024     Lab Results   Component Value Date    ALT 14 05/30/2024    AST 24 05/30/2024    ALKPHOS 103 05/30/2024    BILITOT 0.4 05/30/2024       Lab Results   Component Value Date    CHOL 116 03/23/2023    CHOL 118 01/31/2022    CHOL 109 07/28/2020     Lab Results   Component Value Date    TRIG 129 03/23/2023    TRIG 92 01/31/2022    TRIG 167 (H) 07/28/2020     Lab Results   Component Value Date    HDL 22 (L) 05/30/2024    HDL 29 (L) 03/23/2023    HDL 34 (L) 01/31/2022     No components found for: \"LDLCALC\", \"LDLCHOLESTEROL\"    Lab Results   Component Value Date    VLDL 27 05/30/2024    VLDL 26 03/23/2023    VLDL 18 01/31/2022     Assessment:     1.  NSTEMI:  Resolved.  Stable and will continue present medical regimen. Stable and will continue present medical regimen. There are no concerning symptoms for angina currently.  I have strongly encouraged him to quit smoking entirely in past but he has no interest.          2. CAD (coronary artery disease) Stable and will continue present medical regimen. s/p 2V CABG with LIMA-LAD and SVG-distal RCA on 10/14/15. Most recent stress test 2/9/17 LVEF of 34%; +scar without with no significant ischemia. He does not want any cardiac

## 2024-06-05 ENCOUNTER — TELEPHONE (OUTPATIENT)
Dept: CARDIOLOGY CLINIC | Age: 69
End: 2024-06-05

## 2024-06-05 ENCOUNTER — OFFICE VISIT (OUTPATIENT)
Dept: CARDIOLOGY CLINIC | Age: 69
End: 2024-06-05
Payer: COMMERCIAL

## 2024-06-05 VITALS
SYSTOLIC BLOOD PRESSURE: 146 MMHG | OXYGEN SATURATION: 100 % | BODY MASS INDEX: 25.01 KG/M2 | HEIGHT: 68 IN | HEART RATE: 54 BPM | WEIGHT: 165 LBS | DIASTOLIC BLOOD PRESSURE: 92 MMHG

## 2024-06-05 DIAGNOSIS — Z72.0 TOBACCO ABUSE: ICD-10-CM

## 2024-06-05 DIAGNOSIS — Z76.89 ENCOUNTER TO ESTABLISH CARE: ICD-10-CM

## 2024-06-05 DIAGNOSIS — I10 ESSENTIAL HYPERTENSION: Primary | ICD-10-CM

## 2024-06-05 PROCEDURE — 3017F COLORECTAL CA SCREEN DOC REV: CPT | Performed by: INTERNAL MEDICINE

## 2024-06-05 PROCEDURE — 1123F ACP DISCUSS/DSCN MKR DOCD: CPT | Performed by: INTERNAL MEDICINE

## 2024-06-05 PROCEDURE — G8427 DOCREV CUR MEDS BY ELIG CLIN: HCPCS | Performed by: INTERNAL MEDICINE

## 2024-06-05 PROCEDURE — 3077F SYST BP >= 140 MM HG: CPT | Performed by: INTERNAL MEDICINE

## 2024-06-05 PROCEDURE — 3080F DIAST BP >= 90 MM HG: CPT | Performed by: INTERNAL MEDICINE

## 2024-06-05 PROCEDURE — G8417 CALC BMI ABV UP PARAM F/U: HCPCS | Performed by: INTERNAL MEDICINE

## 2024-06-05 PROCEDURE — 4004F PT TOBACCO SCREEN RCVD TLK: CPT | Performed by: INTERNAL MEDICINE

## 2024-06-05 PROCEDURE — 99214 OFFICE O/P EST MOD 30 MIN: CPT | Performed by: INTERNAL MEDICINE

## 2024-06-05 RX ORDER — LISINOPRIL 20 MG/1
20 TABLET ORAL DAILY
Qty: 90 TABLET | Refills: 2 | Status: CANCELLED | OUTPATIENT
Start: 2024-06-05

## 2024-06-05 RX ORDER — HYDROCHLOROTHIAZIDE 25 MG/1
25 TABLET ORAL EVERY MORNING
Qty: 30 TABLET | Refills: 0 | Status: SHIPPED | OUTPATIENT
Start: 2024-06-05

## 2024-06-05 RX ORDER — HYDROCHLOROTHIAZIDE 25 MG/1
25 TABLET ORAL EVERY MORNING
Qty: 90 TABLET | Refills: 2 | Status: SHIPPED | OUTPATIENT
Start: 2024-06-05

## 2024-06-05 NOTE — TELEPHONE ENCOUNTER
Patient was seen today in office by SMM - patient is being charged for Optivol checks not fully covered by his insurance. Per SMM and patient, patient requests to be un enrolled in Optivol monitoring. CINDY and WONG Washburn

## 2024-06-11 DIAGNOSIS — I48.0 PAF (PAROXYSMAL ATRIAL FIBRILLATION) (HCC): ICD-10-CM

## 2024-06-12 RX ORDER — LISINOPRIL 10 MG/1
TABLET ORAL
Qty: 90 TABLET | Refills: 3 | Status: SHIPPED | OUTPATIENT
Start: 2024-06-12

## 2024-06-12 RX ORDER — CLOPIDOGREL BISULFATE 75 MG/1
75 TABLET ORAL DAILY
Qty: 90 TABLET | Refills: 3 | Status: SHIPPED | OUTPATIENT
Start: 2024-06-12

## 2024-06-12 RX ORDER — ATORVASTATIN CALCIUM 80 MG/1
TABLET, FILM COATED ORAL
Qty: 90 TABLET | Refills: 3 | Status: SHIPPED | OUTPATIENT
Start: 2024-06-12

## 2024-06-12 RX ORDER — CARVEDILOL 25 MG/1
TABLET ORAL
Qty: 180 TABLET | Refills: 3 | Status: SHIPPED | OUTPATIENT
Start: 2024-06-12

## 2024-06-12 NOTE — TELEPHONE ENCOUNTER
Patient last seen on 6/5/24. Advised to follow up 1 year. Next appointment none.   LABS 5/30/24 CBC, CMP LIPID

## 2024-06-13 ENCOUNTER — HOSPITAL ENCOUNTER (OUTPATIENT)
Age: 69
Setting detail: SPECIMEN
Discharge: HOME | End: 2024-06-13
Payer: COMMERCIAL

## 2024-06-13 DIAGNOSIS — I10 ESSENTIAL HYPERTENSION: ICD-10-CM

## 2024-06-13 LAB
ANION GAP SERPL CALCULATED.3IONS-SCNC: 11 MMOL/L (ref 3–16)
BUN SERPL-MCNC: 18 MG/DL (ref 7–20)
CALCIUM SERPL-MCNC: 9.9 MG/DL (ref 8.3–10.6)
CHLORIDE SERPL-SCNC: 100 MMOL/L (ref 99–110)
CO2 SERPL-SCNC: 27 MMOL/L (ref 21–32)
CREAT SERPL-MCNC: 1 MG/DL (ref 0.8–1.3)
GFR SERPLBLD CREATININE-BSD FMLA CKD-EPI: 80 ML/MIN/{1.73_M2}
GLUCOSE SERPL-MCNC: 114 MG/DL (ref 70–99)
POTASSIUM SERPL-SCNC: 4.8 MMOL/L (ref 3.5–5.1)
SODIUM SERPL-SCNC: 138 MMOL/L (ref 136–145)

## 2024-06-13 PROCEDURE — 80048 BASIC METABOLIC PNL TOTAL CA: CPT

## 2024-06-13 PROCEDURE — 36415 COLL VENOUS BLD VENIPUNCTURE: CPT

## 2024-06-14 ENCOUNTER — TELEPHONE (OUTPATIENT)
Dept: CARDIOLOGY CLINIC | Age: 69
End: 2024-06-14

## 2024-07-02 RX ORDER — HYDROCHLOROTHIAZIDE 25 MG/1
25 TABLET ORAL EVERY MORNING
Qty: 90 TABLET | Refills: 3 | Status: SHIPPED | OUTPATIENT
Start: 2024-07-02

## 2024-10-18 PROCEDURE — 93296 REM INTERROG EVL PM/IDS: CPT | Performed by: INTERNAL MEDICINE

## 2024-10-18 PROCEDURE — 93295 DEV INTERROG REMOTE 1/2/MLT: CPT | Performed by: INTERNAL MEDICINE

## 2025-01-10 ENCOUNTER — TELEPHONE (OUTPATIENT)
Dept: CARDIOLOGY | Age: 70
End: 2025-01-10

## 2025-01-29 RX ORDER — HYDROCHLOROTHIAZIDE 25 MG/1
25 TABLET ORAL EVERY MORNING
Qty: 90 TABLET | Refills: 1 | Status: SHIPPED | OUTPATIENT
Start: 2025-01-29

## 2025-01-29 NOTE — TELEPHONE ENCOUNTER
Last Office Visit: 6/5/2024 Provider: SAVI  **Is provider OOT? No    Next Office Visit: Visit date not found Provider: SAVI  **If no OV, when does pt need to be seen? In one year   **Has patient already had 30 day supply? No    Lab orders needed? no   Encounter provider correct? Yes If not, change provider  Script changes since last refill? no    LAST LABS:   BMP:   Lab Results   Component Value Date/Time     06/13/2024 08:22 AM    K 4.8 06/13/2024 08:22 AM     06/13/2024 08:22 AM    CO2 27 06/13/2024 08:22 AM    BUN 18 06/13/2024 08:22 AM    CREATININE 1.0 06/13/2024 08:22 AM    GLUCOSE 114 06/13/2024 08:22 AM    CALCIUM 9.9 06/13/2024 08:22 AM    LABGLOM 80 06/13/2024 08:22 AM    LABGLOM >60 03/23/2023 07:45 AM

## 2025-02-24 DIAGNOSIS — I48.0 PAF (PAROXYSMAL ATRIAL FIBRILLATION) (HCC): ICD-10-CM

## 2025-02-26 RX ORDER — CLOPIDOGREL BISULFATE 75 MG/1
75 TABLET ORAL DAILY
Qty: 100 TABLET | Refills: 0 | Status: SHIPPED | OUTPATIENT
Start: 2025-02-26

## 2025-02-26 RX ORDER — CARVEDILOL 25 MG/1
TABLET ORAL
Qty: 200 TABLET | Refills: 0 | Status: SHIPPED | OUTPATIENT
Start: 2025-02-26

## 2025-02-26 RX ORDER — ATORVASTATIN CALCIUM 80 MG/1
TABLET, FILM COATED ORAL
Qty: 100 TABLET | Refills: 0 | Status: SHIPPED | OUTPATIENT
Start: 2025-02-26

## 2025-02-26 RX ORDER — LISINOPRIL 10 MG/1
TABLET ORAL
Qty: 100 TABLET | Refills: 0 | Status: SHIPPED | OUTPATIENT
Start: 2025-02-26

## 2025-02-26 NOTE — TELEPHONE ENCOUNTER
Will call pt at appropriate time to schedule pt appt. Pt needs June appt with SMM.       Last Office Visit: 6/5/24 Provider: SAVI  **Is provider OOT? No    Next Office Visit: Visit date not found Provider: SAVI  **If no OV, when does pt need to be seen? 1 year  **Has patient already had 30 day supply? No    Lab orders needed? no   Encounter provider correct? Yes If not, change provider  Script changes since last refill? no    LAST LABS:   BMP:   Lab Results   Component Value Date/Time     06/13/2024 08:22 AM    K 4.8 06/13/2024 08:22 AM     06/13/2024 08:22 AM    CO2 27 06/13/2024 08:22 AM    BUN 18 06/13/2024 08:22 AM    CREATININE 1.0 06/13/2024 08:22 AM    GLUCOSE 114 06/13/2024 08:22 AM    CALCIUM 9.9 06/13/2024 08:22 AM    LABGLOM 80 06/13/2024 08:22 AM    LABGLOM >60 03/23/2023 07:45 AM       CBC:   Lab Results   Component Value Date    WBC 4.9 05/30/2024    HGB 13.6 05/30/2024    HCT 39.7 (L) 05/30/2024    MCV 88.1 05/30/2024     05/30/2024      LIPID 5/30/24

## 2025-04-28 RX ORDER — HYDROCHLOROTHIAZIDE 25 MG/1
25 TABLET ORAL EVERY MORNING
Qty: 100 TABLET | Refills: 2 | Status: SHIPPED | OUTPATIENT
Start: 2025-04-28

## 2025-04-28 NOTE — TELEPHONE ENCOUNTER
Patient last seen on 6/5/24. Advised to follow up 1 year. Next appointment 9/3/25.       Lab Results   Component Value Date     06/13/2024    K 4.8 06/13/2024     06/13/2024    CO2 27 06/13/2024    BUN 18 06/13/2024    CREATININE 1.0 06/13/2024    GLUCOSE 114 (H) 06/13/2024    CALCIUM 9.9 06/13/2024    BILITOT 0.4 05/30/2024    ALKPHOS 103 05/30/2024    AST 24 05/30/2024    ALT 14 05/30/2024    LABGLOM 80 06/13/2024    GFRAA >60 01/31/2022    AGRATIO 1.1 05/30/2024    GLOB 2.5 07/28/2020

## 2025-05-05 DIAGNOSIS — I48.0 PAF (PAROXYSMAL ATRIAL FIBRILLATION) (HCC): ICD-10-CM

## 2025-05-05 DIAGNOSIS — I10 ESSENTIAL HYPERTENSION: Primary | ICD-10-CM

## 2025-05-05 DIAGNOSIS — Z79.899 MEDICATION MANAGEMENT: ICD-10-CM

## 2025-05-05 DIAGNOSIS — I25.10 CORONARY ARTERY DISEASE INVOLVING NATIVE HEART WITHOUT ANGINA PECTORIS, UNSPECIFIED VESSEL OR LESION TYPE: ICD-10-CM

## 2025-05-06 RX ORDER — ATORVASTATIN CALCIUM 80 MG/1
80 TABLET, FILM COATED ORAL NIGHTLY
Qty: 100 TABLET | Refills: 2 | Status: SHIPPED | OUTPATIENT
Start: 2025-05-06

## 2025-05-06 RX ORDER — LISINOPRIL 10 MG/1
10 TABLET ORAL DAILY
Qty: 100 TABLET | Refills: 2 | Status: SHIPPED | OUTPATIENT
Start: 2025-05-06

## 2025-05-06 RX ORDER — CARVEDILOL 25 MG/1
25 TABLET ORAL 2 TIMES DAILY
Qty: 200 TABLET | Refills: 2 | Status: SHIPPED | OUTPATIENT
Start: 2025-05-06

## 2025-05-06 RX ORDER — CLOPIDOGREL BISULFATE 75 MG/1
75 TABLET ORAL DAILY
Qty: 100 TABLET | Refills: 2 | Status: SHIPPED | OUTPATIENT
Start: 2025-05-06

## 2025-05-06 NOTE — TELEPHONE ENCOUNTER
Last Office Visit: 6/5/24 Provider: SAVI  **Is provider OOT? Yes    Next Office Visit: 9/3/25 Provider: SAVI    LAST LABS:   CBC:  Lab Results   Component Value Date    WBC 4.9 05/30/2024    HGB 13.6 05/30/2024    HCT 39.7 (L) 05/30/2024    MCV 88.1 05/30/2024     05/30/2024    LYMPHOPCT 29.0 05/30/2024    RBC 4.51 05/30/2024    MCH 30.1 05/30/2024    MCHC 34.1 05/30/2024    RDW 14.3 05/30/2024           CMP:  Lab Results   Component Value Date     06/13/2024    K 4.8 06/13/2024     06/13/2024    CO2 27 06/13/2024    BUN 18 06/13/2024    CREATININE 1.0 06/13/2024    GLUCOSE 114 (H) 06/13/2024    CALCIUM 9.9 06/13/2024    BILITOT 0.4 05/30/2024    ALKPHOS 103 05/30/2024    AST 24 05/30/2024    ALT 14 05/30/2024    LABGLOM 80 06/13/2024    GFRAA >60 01/31/2022    AGRATIO 1.1 05/30/2024    GLOB 2.5 07/28/2020         Lipid:  Lab Results   Component Value Date    CHOL 116 03/23/2023    TRIG 129 03/23/2023    HDL 22 (L) 05/30/2024    LDL 49 05/30/2024    VLDL 27 05/30/2024

## 2025-05-07 DIAGNOSIS — I10 ESSENTIAL HYPERTENSION: ICD-10-CM

## 2025-05-07 DIAGNOSIS — Z79.899 MEDICATION MANAGEMENT: ICD-10-CM

## 2025-05-07 DIAGNOSIS — I25.10 CORONARY ARTERY DISEASE INVOLVING NATIVE HEART WITHOUT ANGINA PECTORIS, UNSPECIFIED VESSEL OR LESION TYPE: ICD-10-CM

## 2025-05-08 LAB
ALBUMIN SERPL-MCNC: 4.5 G/DL (ref 3.4–5)
ALBUMIN/GLOB SERPL: 1.9 {RATIO} (ref 1.1–2.2)
ALP SERPL-CCNC: 95 U/L (ref 40–129)
ALT SERPL-CCNC: 18 U/L (ref 10–40)
ANION GAP SERPL CALCULATED.3IONS-SCNC: 9 MMOL/L (ref 3–16)
AST SERPL-CCNC: 24 U/L (ref 15–37)
BASOPHILS # BLD: 0.1 K/UL (ref 0–0.2)
BASOPHILS NFR BLD: 1.1 %
BILIRUB SERPL-MCNC: 0.5 MG/DL (ref 0–1)
BUN SERPL-MCNC: 15 MG/DL (ref 7–20)
CALCIUM SERPL-MCNC: 9.9 MG/DL (ref 8.3–10.6)
CHLORIDE SERPL-SCNC: 103 MMOL/L (ref 99–110)
CHOLEST SERPL-MCNC: 109 MG/DL (ref 0–199)
CO2 SERPL-SCNC: 25 MMOL/L (ref 21–32)
CREAT SERPL-MCNC: 1 MG/DL (ref 0.8–1.3)
DEPRECATED RDW RBC AUTO: 13.5 % (ref 12.4–15.4)
EOSINOPHIL # BLD: 0.1 K/UL (ref 0–0.6)
EOSINOPHIL NFR BLD: 2.1 %
GFR SERPLBLD CREATININE-BSD FMLA CKD-EPI: 80 ML/MIN/{1.73_M2}
GLUCOSE SERPL-MCNC: 129 MG/DL (ref 70–99)
HCT VFR BLD AUTO: 44.2 % (ref 40.5–52.5)
HDLC SERPL-MCNC: 25 MG/DL (ref 40–60)
HGB BLD-MCNC: 14.9 G/DL (ref 13.5–17.5)
LDLC SERPL CALC-MCNC: 55 MG/DL
LYMPHOCYTES # BLD: 1.4 K/UL (ref 1–5.1)
LYMPHOCYTES NFR BLD: 24.1 %
MCH RBC QN AUTO: 30 PG (ref 26–34)
MCHC RBC AUTO-ENTMCNC: 33.8 G/DL (ref 31–36)
MCV RBC AUTO: 88.8 FL (ref 80–100)
MONOCYTES # BLD: 0.6 K/UL (ref 0–1.3)
MONOCYTES NFR BLD: 10.6 %
NEUTROPHILS # BLD: 3.6 K/UL (ref 1.7–7.7)
NEUTROPHILS NFR BLD: 62.1 %
PLATELET # BLD AUTO: 137 K/UL (ref 135–450)
PMV BLD AUTO: 12.2 FL (ref 5–10.5)
POTASSIUM SERPL-SCNC: 4.4 MMOL/L (ref 3.5–5.1)
PROT SERPL-MCNC: 6.9 G/DL (ref 6.4–8.2)
RBC # BLD AUTO: 4.98 M/UL (ref 4.2–5.9)
SODIUM SERPL-SCNC: 137 MMOL/L (ref 136–145)
TRIGL SERPL-MCNC: 147 MG/DL (ref 0–150)
TSH SERPL DL<=0.005 MIU/L-ACNC: 1.2 UIU/ML (ref 0.27–4.2)
VLDLC SERPL CALC-MCNC: 29 MG/DL
WBC # BLD AUTO: 5.8 K/UL (ref 4–11)

## 2025-05-13 ENCOUNTER — RESULTS FOLLOW-UP (OUTPATIENT)
Dept: CARDIOLOGY CLINIC | Age: 70
End: 2025-05-13

## 2025-07-09 ENCOUNTER — TELEPHONE (OUTPATIENT)
Dept: CARDIOLOGY | Age: 70
End: 2025-07-09

## 2025-07-09 DIAGNOSIS — I48.0 PAF (PAROXYSMAL ATRIAL FIBRILLATION) (HCC): Primary | ICD-10-CM

## 2025-07-09 DIAGNOSIS — I47.20 VENTRICULAR TACHYCARDIA (HCC): ICD-10-CM

## 2025-07-10 NOTE — TELEPHONE ENCOUNTER
Attempted to call pt @ primary phone # listed and pt wife requesting us call home number to speak w/ pt. Upon calling home # no answer and no option to LVM. Will try again at later time

## 2025-07-10 NOTE — TELEPHONE ENCOUNTER
Please let patient know his heart rates are borderline tachycardic.  Please see if he open to starting on digoxin.  If he is please see if nurse practitioner consented him the lowest dose of digoxin 62.5 mcg.   He will need level in 3 weeks.  Thanks.    Ye Baker MD  Cardiac Electrophysiology  [facility]  [phone] [place] Office

## 2025-07-16 NOTE — TELEPHONE ENCOUNTER
Attempted to call home #, no answer and no option to LVM. I then attempted to call the patients mobile number which I was also unable to LVM. Will reattempt at a later time.

## 2025-07-17 RX ORDER — DIGOXIN 125 MCG
62.5 TABLET ORAL DAILY
Qty: 15 TABLET | Refills: 0 | OUTPATIENT
Start: 2025-07-17

## 2025-07-17 RX ORDER — DIGOXIN 0.06 MG/1
62.5 TABLET ORAL DAILY
Qty: 30 TABLET | Refills: 0 | Status: SHIPPED | OUTPATIENT
Start: 2025-07-17 | End: 2025-07-18 | Stop reason: SDUPTHER

## 2025-07-17 NOTE — TELEPHONE ENCOUNTER
Pt gave verbal consent for [facility] to speak to Felicitas Segura... Felicitas stated that [facility] wont have digoxin for 2days. And she asked if the prescription to be sent to [facility] pharmacy in [place].     Good call back number is  [phone]

## 2025-07-17 NOTE — TELEPHONE ENCOUNTER
I called and spoke with pt wife tatianna and she stated insurance will cover the 125 mcg tablets. Sent in the 125mcg and pt can cut in half. Spoke with [facility] and they have supply.

## 2025-07-18 ENCOUNTER — TELEPHONE (OUTPATIENT)
Dept: CARDIOLOGY CLINIC | Age: 70
End: 2025-07-18

## 2025-07-18 RX ORDER — DIGOXIN 125 MCG
62.5 TABLET ORAL DAILY
Qty: 45 TABLET | Refills: 0 | Status: SHIPPED | OUTPATIENT
Start: 2025-07-18

## 2025-07-18 NOTE — TELEPHONE ENCOUNTER
Please let patient know his heart rates are borderline tachycardic.  Please see if he open to starting on digoxin.  If he is please see if nurse practitioner consented him the lowest dose of digoxin 62.5 mcg.   He will need level in 3 weeks.  Thanks.

## 2025-07-18 NOTE — TELEPHONE ENCOUNTER
Insurance will only cover digoxin 125 mcg, so I resent script to new pharmacy for her to split in half (62.5 mcg)

## 2025-07-18 NOTE — TELEPHONE ENCOUNTER
Pt's spouse called requesting the prescription for   digoxin (LANOXIN) 125 MCG tablet [[id number]]    Be sent to   [facility] [id number] - [place], [place] - [address] -  [phone] - F [phone] [[id number]]

## 2025-08-01 DIAGNOSIS — I47.20 VENTRICULAR TACHYCARDIA (HCC): ICD-10-CM

## 2025-08-01 DIAGNOSIS — I48.0 PAF (PAROXYSMAL ATRIAL FIBRILLATION) (HCC): ICD-10-CM

## 2025-08-02 LAB — DIGOXIN SERPL-MCNC: 0.4 NG/ML (ref 0.8–2)

## 2025-09-02 ENCOUNTER — TELEPHONE (OUTPATIENT)
Dept: CARDIOLOGY CLINIC | Age: 70
End: 2025-09-02

## 2025-09-03 ENCOUNTER — OFFICE VISIT (OUTPATIENT)
Dept: CARDIOLOGY CLINIC | Age: 70
End: 2025-09-03
Payer: MEDICARE

## 2025-09-03 VITALS
HEART RATE: 71 BPM | HEIGHT: 68 IN | WEIGHT: 165.9 LBS | DIASTOLIC BLOOD PRESSURE: 78 MMHG | BODY MASS INDEX: 25.14 KG/M2 | OXYGEN SATURATION: 97 % | SYSTOLIC BLOOD PRESSURE: 138 MMHG

## 2025-09-03 DIAGNOSIS — I25.10 CORONARY ARTERY DISEASE INVOLVING NATIVE HEART WITHOUT ANGINA PECTORIS, UNSPECIFIED VESSEL OR LESION TYPE: Primary | ICD-10-CM

## 2025-09-03 DIAGNOSIS — Z72.0 TOBACCO ABUSE: ICD-10-CM

## 2025-09-03 DIAGNOSIS — Z76.89 ENCOUNTER TO ESTABLISH CARE: ICD-10-CM

## 2025-09-03 DIAGNOSIS — I48.0 PAF (PAROXYSMAL ATRIAL FIBRILLATION) (HCC): ICD-10-CM

## 2025-09-03 DIAGNOSIS — I10 PRIMARY HYPERTENSION: ICD-10-CM

## 2025-09-03 PROCEDURE — 1123F ACP DISCUSS/DSCN MKR DOCD: CPT | Performed by: INTERNAL MEDICINE

## 2025-09-03 PROCEDURE — 3078F DIAST BP <80 MM HG: CPT | Performed by: INTERNAL MEDICINE

## 2025-09-03 PROCEDURE — 1159F MED LIST DOCD IN RCRD: CPT | Performed by: INTERNAL MEDICINE

## 2025-09-03 PROCEDURE — 93000 ELECTROCARDIOGRAM COMPLETE: CPT | Performed by: INTERNAL MEDICINE

## 2025-09-03 PROCEDURE — 99214 OFFICE O/P EST MOD 30 MIN: CPT | Performed by: INTERNAL MEDICINE

## 2025-09-03 PROCEDURE — 3075F SYST BP GE 130 - 139MM HG: CPT | Performed by: INTERNAL MEDICINE

## 2025-09-03 RX ORDER — DIGOXIN 125 MCG
62.5 TABLET ORAL DAILY
Qty: 45 TABLET | Refills: 1 | Status: SHIPPED | OUTPATIENT
Start: 2025-09-03